# Patient Record
Sex: FEMALE | Race: WHITE | NOT HISPANIC OR LATINO | Employment: FULL TIME | URBAN - METROPOLITAN AREA
[De-identification: names, ages, dates, MRNs, and addresses within clinical notes are randomized per-mention and may not be internally consistent; named-entity substitution may affect disease eponyms.]

---

## 2017-01-23 ENCOUNTER — LAB (OUTPATIENT)
Dept: ENDOCRINOLOGY | Age: 32
End: 2017-01-23

## 2017-01-23 DIAGNOSIS — E10.65 UNCONTROLLED TYPE 1 DIABETES MELLITUS WITH HYPERGLYCEMIA (HCC): ICD-10-CM

## 2017-01-23 DIAGNOSIS — E55.9 VITAMIN D DEFICIENCY DISEASE: Primary | ICD-10-CM

## 2017-01-23 DIAGNOSIS — E55.9 VITAMIN D DEFICIENCY DISEASE: ICD-10-CM

## 2017-01-24 LAB
25(OH)D3+25(OH)D2 SERPL-MCNC: 52.3 NG/ML (ref 30–100)
ALBUMIN SERPL-MCNC: 4.4 G/DL (ref 3.5–5.2)
ALBUMIN/GLOB SERPL: 1.6 G/DL
ALP SERPL-CCNC: 53 U/L (ref 39–117)
ALT SERPL-CCNC: 27 U/L (ref 1–33)
AST SERPL-CCNC: 35 U/L (ref 1–32)
BILIRUB SERPL-MCNC: 0.5 MG/DL (ref 0.1–1.2)
BUN SERPL-MCNC: 10 MG/DL (ref 6–20)
BUN/CREAT SERPL: 11.4 (ref 7–25)
CALCIUM SERPL-MCNC: 9.9 MG/DL (ref 8.6–10.5)
CHLORIDE SERPL-SCNC: 100 MMOL/L (ref 98–107)
CHOLEST SERPL-MCNC: 185 MG/DL (ref 0–200)
CO2 SERPL-SCNC: 25.3 MMOL/L (ref 22–29)
CREAT SERPL-MCNC: 0.88 MG/DL (ref 0.57–1)
GLOBULIN SER CALC-MCNC: 2.7 GM/DL
GLUCOSE SERPL-MCNC: 170 MG/DL (ref 65–99)
HBA1C MFR BLD: 7.74 % (ref 4.8–5.6)
HDLC SERPL-MCNC: 77 MG/DL (ref 40–60)
LDLC SERPL CALC-MCNC: 93 MG/DL (ref 0–100)
MICROALBUMIN UR-MCNC: 13.7 UG/ML
POTASSIUM SERPL-SCNC: 4.2 MMOL/L (ref 3.5–5.2)
PROT SERPL-MCNC: 7.1 G/DL (ref 6–8.5)
SODIUM SERPL-SCNC: 140 MMOL/L (ref 136–145)
T3FREE SERPL-MCNC: 3.3 PG/ML (ref 2–4.4)
T4 FREE SERPL-MCNC: 1.35 NG/DL (ref 0.93–1.7)
T4 SERPL-MCNC: 7 MCG/DL (ref 4.5–11.7)
TRIGL SERPL-MCNC: 74 MG/DL (ref 0–150)
TSH SERPL DL<=0.005 MIU/L-ACNC: 2.99 MIU/ML (ref 0.27–4.2)
VLDLC SERPL CALC-MCNC: 14.8 MG/DL (ref 5–40)

## 2017-02-06 ENCOUNTER — OFFICE VISIT (OUTPATIENT)
Dept: ENDOCRINOLOGY | Age: 32
End: 2017-02-06

## 2017-02-06 VITALS
RESPIRATION RATE: 16 BRPM | SYSTOLIC BLOOD PRESSURE: 118 MMHG | BODY MASS INDEX: 26.43 KG/M2 | HEIGHT: 67 IN | DIASTOLIC BLOOD PRESSURE: 70 MMHG | WEIGHT: 168.4 LBS

## 2017-02-06 DIAGNOSIS — E10.65 UNCONTROLLED TYPE 1 DIABETES MELLITUS WITH HYPERGLYCEMIA (HCC): Primary | ICD-10-CM

## 2017-02-06 DIAGNOSIS — E04.2 NONTOXIC MULTINODULAR GOITER: ICD-10-CM

## 2017-02-06 DIAGNOSIS — E55.9 VITAMIN D DEFICIENCY DISEASE: ICD-10-CM

## 2017-02-06 PROCEDURE — 99214 OFFICE O/P EST MOD 30 MIN: CPT | Performed by: INTERNAL MEDICINE

## 2017-02-06 NOTE — PROGRESS NOTES
Subjective   Nandini Morrison is a 31 y.o. female seen for follow up for DM1, lab review. Patient is checking BG 6-8 times a day. She is needing a refill on her pen needles. She denies any problems or concerns.   History of Present Illness this is a 31-year-old female known patient with type I diabetes and vitamin D deficiency.  Over the course of last 6 months she has had no significant health problems for which to go to emergency room or hospital.  Of point of interest is her mother who is suffering from hypothyroidism.    The following portions of the patient's history were reviewed and updated as appropriate: allergies, current medications, past family history, past medical history, past social history, past surgical history and problem list.    Review of Systems   Constitutional: Negative.    HENT: Negative.    Eyes: Negative.    Respiratory: Negative.    Cardiovascular: Negative.    Gastrointestinal: Negative.    Endocrine: Negative.    Genitourinary: Negative.    Musculoskeletal: Negative.    Skin: Negative.    Allergic/Immunologic: Negative.    Neurological: Negative.    Hematological: Negative.    Psychiatric/Behavioral: Negative.        Objective   Physical Exam   Constitutional: She is oriented to person, place, and time. She appears well-developed and well-nourished. No distress.   HENT:   Head: Normocephalic and atraumatic.   Right Ear: External ear normal.   Left Ear: External ear normal.   Nose: Nose normal.   Mouth/Throat: Oropharynx is clear and moist. No oropharyngeal exudate.   Eyes: Conjunctivae and EOM are normal. Pupils are equal, round, and reactive to light. Right eye exhibits no discharge. Left eye exhibits no discharge. No scleral icterus.   Neck: Trachea normal, normal range of motion and full passive range of motion without pain. Neck supple. No JVD present. No tracheal tenderness present. Carotid bruit is not present. No tracheal deviation, no edema and no erythema present. Thyromegaly  present. No thyroid mass present.   Slight bilateral enlargement of the thyroid.   Cardiovascular: Normal rate, regular rhythm, normal heart sounds and intact distal pulses.  Exam reveals no gallop and no friction rub.    No murmur heard.  Pulmonary/Chest: Effort normal and breath sounds normal. No stridor. No respiratory distress. She has no wheezes. She has no rales.   Abdominal: Soft. Bowel sounds are normal. She exhibits no distension and no mass. There is no tenderness. There is no rebound and no guarding. No hernia.   Musculoskeletal: Normal range of motion. She exhibits no edema or deformity.   Lymphadenopathy:     She has no cervical adenopathy.   Neurological: She is alert and oriented to person, place, and time. She has normal reflexes. She displays normal reflexes. No cranial nerve deficit. She exhibits normal muscle tone. Coordination normal.   Skin: Skin is warm and dry. No rash noted. She is not diaphoretic. No erythema. No pallor.   Psychiatric: She has a normal mood and affect. Her behavior is normal. Judgment and thought content normal.   Nursing note and vitals reviewed.        Assessment/Plan   Diagnoses and all orders for this visit:    Uncontrolled type 1 diabetes mellitus with hyperglycemia  -     T4 & TSH ([facility]); Future  -     Uric Acid; Future  -     Vitamin D 25 Hydroxy; Future  -     Comprehensive Metabolic Panel; Future  -     Lipid Panel; Future  -     MicroAlbumin, Urine, Random; Future  -     Cancel: T3, Free; Future  -     Cancel: T4, Free; Future  -     Cancel: Thyroglobulin With Anti-TG; Future  -     T3, Free; Future  -     T4, Free; Future  -     Thyroglobulin With Anti-TG; Future    Vitamin D deficiency disease  -     T4 & TSH ([facility]); Future  -     Uric Acid; Future  -     Vitamin D 25 Hydroxy; Future  -     Comprehensive Metabolic Panel; Future  -     Lipid Panel; Future  -     MicroAlbumin, Urine, Random; Future  -     Cancel: T3, Free; Future  -     Cancel: T4, Free;  "Future  -     Cancel: Thyroglobulin With Anti-TG; Future  -     T3, Free; Future  -     T4, Free; Future  -     Thyroglobulin With Anti-TG; Future    Nontoxic multinodular goiter  -     Cancel: T3, Free; Future  -     Cancel: T4, Free; Future  -     Cancel: Thyroglobulin With Anti-TG; Future  -     T3, Free; Future  -     T4, Free; Future  -     Thyroglobulin With Anti-TG; Future    Other orders  -     Needle, Disp, (BD DISP NEEDLES) 25G X 5/8\" misc; 4 times a day for insulin injection.              in summary I saw and examined this 31-year-old female for above-mentioned problems.  I reviewed her laboratory evaluation of 01/23/2017 and provided her with a hard copy of it.  With the exception of a slightly elevated of hemoglobin A1c of 7.73 she is otherwise clinically and metabolically stable.  I also discussed with her new [employer] insulin pumps which calmed with a sensor and makes the glycemic control easier to manage.  She expressed interest in getting a brochure large literature about this device and then will let us know.  She will see Ms. Elo Patrick in 4 months and myself in 8 months or sooner if needed with laboratory evaluation prior to office visit.    "

## 2017-02-14 DIAGNOSIS — E10.65 UNCONTROLLED TYPE 1 DIABETES MELLITUS WITH HYPERGLYCEMIA (HCC): ICD-10-CM

## 2017-02-14 RX ORDER — INSULIN GLARGINE 100 [IU]/ML
INJECTION, SOLUTION SUBCUTANEOUS
Qty: 30 ML | Refills: 3 | Status: SHIPPED | OUTPATIENT
Start: 2017-02-14 | End: 2017-02-16 | Stop reason: CLARIF

## 2017-02-14 RX ORDER — ERGOCALCIFEROL 1.25 MG/1
CAPSULE ORAL
Qty: 12 CAPSULE | Refills: 3 | Status: SHIPPED | OUTPATIENT
Start: 2017-02-14 | End: 2018-02-28 | Stop reason: SDUPTHER

## 2017-02-16 RX ORDER — INSULIN DEGLUDEC 200 U/ML
25 INJECTION, SOLUTION SUBCUTANEOUS DAILY
Qty: 6 PEN | Refills: 3 | Status: SHIPPED | OUTPATIENT
Start: 2017-02-16 | End: 2017-06-20 | Stop reason: SDUPTHER

## 2017-05-24 ENCOUNTER — RESULTS ENCOUNTER (OUTPATIENT)
Dept: ENDOCRINOLOGY | Age: 32
End: 2017-05-24

## 2017-05-24 DIAGNOSIS — E10.65 UNCONTROLLED TYPE 1 DIABETES MELLITUS WITH HYPERGLYCEMIA (HCC): ICD-10-CM

## 2017-05-24 DIAGNOSIS — E04.2 NONTOXIC MULTINODULAR GOITER: ICD-10-CM

## 2017-05-24 DIAGNOSIS — E55.9 VITAMIN D DEFICIENCY DISEASE: ICD-10-CM

## 2017-06-19 ENCOUNTER — OFFICE VISIT (OUTPATIENT)
Dept: ENDOCRINOLOGY | Age: 32
End: 2017-06-19

## 2017-06-19 VITALS
BODY MASS INDEX: 26.87 KG/M2 | WEIGHT: 171.2 LBS | HEIGHT: 67 IN | DIASTOLIC BLOOD PRESSURE: 66 MMHG | SYSTOLIC BLOOD PRESSURE: 124 MMHG

## 2017-06-19 DIAGNOSIS — E55.9 VITAMIN D DEFICIENCY DISEASE: ICD-10-CM

## 2017-06-19 DIAGNOSIS — IMO0002 UNCONTROLLED TYPE 1 DIABETES MELLITUS WITH COMPLICATION: Primary | ICD-10-CM

## 2017-06-19 DIAGNOSIS — E04.2 NONTOXIC MULTINODULAR GOITER: ICD-10-CM

## 2017-06-19 PROCEDURE — 99214 OFFICE O/P EST MOD 30 MIN: CPT | Performed by: NURSE PRACTITIONER

## 2017-06-20 LAB
25(OH)D3+25(OH)D2 SERPL-MCNC: 40.2 NG/ML (ref 30–100)
ALBUMIN SERPL-MCNC: 4.6 G/DL (ref 3.5–5.2)
ALBUMIN/GLOB SERPL: 1.6 G/DL
ALP SERPL-CCNC: 47 U/L (ref 39–117)
ALT SERPL-CCNC: 27 U/L (ref 1–33)
AST SERPL-CCNC: 26 U/L (ref 1–32)
BILIRUB SERPL-MCNC: 0.3 MG/DL (ref 0.1–1.2)
BUN SERPL-MCNC: 15 MG/DL (ref 6–20)
BUN/CREAT SERPL: 16.7 (ref 7–25)
CALCIUM SERPL-MCNC: 9.8 MG/DL (ref 8.6–10.5)
CHLORIDE SERPL-SCNC: 98 MMOL/L (ref 98–107)
CHOLEST SERPL-MCNC: 188 MG/DL (ref 0–200)
CO2 SERPL-SCNC: 26.5 MMOL/L (ref 22–29)
CREAT SERPL-MCNC: 0.9 MG/DL (ref 0.57–1)
FT4I SERPL CALC-MCNC: 1.8 (ref 1.2–4.9)
GLOBULIN SER CALC-MCNC: 2.8 GM/DL
GLUCOSE SERPL-MCNC: 80 MG/DL (ref 65–99)
HBA1C MFR BLD: 7.76 % (ref 4.8–5.6)
HDLC SERPL-MCNC: 81 MG/DL (ref 40–60)
LDLC SERPL CALC-MCNC: 99 MG/DL (ref 0–100)
MICROALBUMIN UR-MCNC: <3 UG/ML
POTASSIUM SERPL-SCNC: 5.3 MMOL/L (ref 3.5–5.2)
PROT SERPL-MCNC: 7.4 G/DL (ref 6–8.5)
SODIUM SERPL-SCNC: 137 MMOL/L (ref 136–145)
T3FREE SERPL-MCNC: 3.3 PG/ML (ref 2–4.4)
T3RU NFR SERPL: 28 % (ref 24–39)
T4 FREE SERPL-MCNC: 1.37 NG/DL (ref 0.93–1.7)
T4 SERPL-MCNC: 6.5 UG/DL (ref 4.5–12)
TRIGL SERPL-MCNC: 41 MG/DL (ref 0–150)
TSH SERPL DL<=0.005 MIU/L-ACNC: 4.11 UIU/ML (ref 0.45–4.5)
VLDLC SERPL CALC-MCNC: 8.2 MG/DL (ref 5–40)

## 2017-06-20 RX ORDER — INSULIN DEGLUDEC 200 U/ML
30 INJECTION, SOLUTION SUBCUTANEOUS DAILY
Qty: 6 PEN | Refills: 3 | Status: SHIPPED | OUTPATIENT
Start: 2017-06-20 | End: 2018-10-08 | Stop reason: SDUPTHER

## 2017-08-21 DIAGNOSIS — IMO0002 UNCONTROLLED TYPE 1 DIABETES MELLITUS WITH COMPLICATION: ICD-10-CM

## 2017-08-21 RX ORDER — BLOOD SUGAR DIAGNOSTIC
1 STRIP MISCELLANEOUS 4 TIMES DAILY
Qty: 400 EACH | Refills: 1 | Status: SHIPPED | OUTPATIENT
Start: 2017-08-21 | End: 2017-12-04 | Stop reason: SDUPTHER

## 2017-10-26 ENCOUNTER — LAB (OUTPATIENT)
Dept: ENDOCRINOLOGY | Age: 32
End: 2017-10-26

## 2017-10-26 DIAGNOSIS — E55.9 VITAMIN D DEFICIENCY DISEASE: ICD-10-CM

## 2017-10-26 DIAGNOSIS — E10.65 UNCONTROLLED TYPE 1 DIABETES MELLITUS WITH HYPERGLYCEMIA (HCC): ICD-10-CM

## 2017-10-27 LAB
25(OH)D3+25(OH)D2 SERPL-MCNC: 56 NG/ML (ref 30–100)
ALBUMIN SERPL-MCNC: 4.6 G/DL (ref 3.5–5.2)
ALBUMIN/GLOB SERPL: 1.6 G/DL
ALP SERPL-CCNC: 49 U/L (ref 39–117)
ALT SERPL-CCNC: 25 U/L (ref 1–33)
AST SERPL-CCNC: 27 U/L (ref 1–32)
BILIRUB SERPL-MCNC: 0.6 MG/DL (ref 0.1–1.2)
BUN SERPL-MCNC: 9 MG/DL (ref 6–20)
BUN/CREAT SERPL: 12.3 (ref 7–25)
CALCIUM SERPL-MCNC: 9.8 MG/DL (ref 8.6–10.5)
CHLORIDE SERPL-SCNC: 97 MMOL/L (ref 98–107)
CHOLEST SERPL-MCNC: 190 MG/DL (ref 0–200)
CO2 SERPL-SCNC: 27.6 MMOL/L (ref 22–29)
CREAT SERPL-MCNC: 0.73 MG/DL (ref 0.57–1)
GFR SERPLBLD CREATININE-BSD FMLA CKD-EPI: 113 ML/MIN/1.73
GFR SERPLBLD CREATININE-BSD FMLA CKD-EPI: 93 ML/MIN/1.73
GLOBULIN SER CALC-MCNC: 2.8 GM/DL
GLUCOSE SERPL-MCNC: 238 MG/DL (ref 65–99)
HDLC SERPL-MCNC: 80 MG/DL (ref 40–60)
LDLC SERPL CALC-MCNC: 101 MG/DL (ref 0–100)
MICROALBUMIN UR-MCNC: 3.3 UG/ML
POTASSIUM SERPL-SCNC: 4.5 MMOL/L (ref 3.5–5.2)
PROT SERPL-MCNC: 7.4 G/DL (ref 6–8.5)
SODIUM SERPL-SCNC: 139 MMOL/L (ref 136–145)
T3FREE SERPL-MCNC: 3.3 PG/ML (ref 2–4.4)
T4 FREE SERPL-MCNC: 1.37 NG/DL (ref 0.93–1.7)
T4 SERPL-MCNC: 7.1 MCG/DL (ref 4.5–11.7)
THYROGLOB AB SERPL-ACNC: <1 IU/ML (ref 0–0.9)
THYROGLOB SERPL-MCNC: 3.3 NG/ML (ref 1.5–38.5)
TRIGL SERPL-MCNC: 43 MG/DL (ref 0–150)
TSH SERPL DL<=0.005 MIU/L-ACNC: 3.05 MIU/ML (ref 0.27–4.2)
URATE SERPL-MCNC: 3.8 MG/DL (ref 2.4–5.7)
VLDLC SERPL CALC-MCNC: 8.6 MG/DL (ref 5–40)

## 2017-11-09 ENCOUNTER — OFFICE VISIT (OUTPATIENT)
Dept: ENDOCRINOLOGY | Age: 32
End: 2017-11-09

## 2017-11-09 VITALS
BODY MASS INDEX: 27 KG/M2 | WEIGHT: 172 LBS | RESPIRATION RATE: 16 BRPM | HEIGHT: 67 IN | DIASTOLIC BLOOD PRESSURE: 70 MMHG | SYSTOLIC BLOOD PRESSURE: 116 MMHG

## 2017-11-09 DIAGNOSIS — E55.9 VITAMIN D DEFICIENCY DISEASE: ICD-10-CM

## 2017-11-09 DIAGNOSIS — E10.9 TYPE 1 DIABETES MELLITUS WITHOUT COMPLICATION (HCC): Primary | ICD-10-CM

## 2017-11-09 DIAGNOSIS — E04.2 NONTOXIC MULTINODULAR GOITER: ICD-10-CM

## 2017-11-09 PROCEDURE — 99214 OFFICE O/P EST MOD 30 MIN: CPT | Performed by: INTERNAL MEDICINE

## 2017-11-09 NOTE — PROGRESS NOTES
"Subjective   Nandini Morrisno is a 31 y.o. female seen for follow up for DM1, lab review. Patient is checking BG 4-6 times a day. She denies any problems or concerns.     History of Present Illness this is a 31-year-old female known patient with type I diabetes, thyroid goiter and vitamin D deficiency.  Over the course of last 4 months she has had no significant health problems for which to go to emergency room or hospital.    /70  Resp 16  Ht 67\" (170.2 cm)  Wt 172 lb (78 kg)  BMI 26.94 kg/m2     Allergies   Allergen Reactions   • Amoxicillin        Current Outpatient Prescriptions:   •  ACCU-CHEK KALYN PLUS test strip, 1 each by Other route 4 (Four) Times a Day. Use as instructed, Disp: 400 each, Rfl: 1  •  ACCU-CHEK FASTCLIX LANCETS misc, 4 (four) times a day., Disp: , Rfl:   •  ergocalciferol (DRISDOL) 95727 UNITS capsule, Take 1 po q week, Disp: 12 capsule, Rfl: 3  •  glucagon (GLUCAGON EMERGENCY) 1 MG injection, Inject 1 mg under the skin 1 (one) time as needed for low blood sugar for up to 1 dose., Disp: 1 kit, Rfl: 5  •  Multiple Vitamins-Minerals (MULTIVITAMIN PO), Take 1 tablet by mouth daily., Disp: , Rfl:   •  Needle, Disp, (BD DISP NEEDLES) 25G X 5/8\" misc, 4 times a day for insulin injection., Disp: 400 each, Rfl: 3  •  NOVOLOG FLEXPEN 100 UNIT/ML solution pen-injector sc pen, Up to 50 units daily for multiple injections, Disp: 45 mL, Rfl: 2  •  TRESIBA FLEXTOUCH 200 UNIT/ML solution pen-injector, Inject 30 Units under the skin Daily., Disp: 6 pen, Rfl: 3      The following portions of the patient's history were reviewed and updated as appropriate: allergies, current medications, past family history, past medical history, past social history, past surgical history and problem list.    Review of Systems   Constitutional: Negative.    HENT: Negative.    Eyes: Negative.    Respiratory: Negative.    Cardiovascular: Negative.    Gastrointestinal: Negative.    Endocrine: Negative.    Genitourinary: " Negative.    Musculoskeletal: Negative.    Skin: Negative.    Allergic/Immunologic: Negative.    Neurological: Negative.    Hematological: Negative.    Psychiatric/Behavioral: Negative.        Objective   Physical Exam   Constitutional: She is oriented to person, place, and time. She appears well-developed and well-nourished. No distress.   HENT:   Head: Normocephalic and atraumatic.   Right Ear: External ear normal.   Left Ear: External ear normal.   Nose: Nose normal.   Mouth/Throat: Oropharynx is clear and moist. No oropharyngeal exudate.   Eyes: Conjunctivae and EOM are normal. Pupils are equal, round, and reactive to light. Right eye exhibits no discharge. Left eye exhibits no discharge. No scleral icterus.   Neck: Trachea normal, normal range of motion and full passive range of motion without pain. Neck supple. No JVD present. No tracheal tenderness present. Carotid bruit is not present. No tracheal deviation, no edema and no erythema present. Thyromegaly present. No thyroid mass present.   Slight bilateral enlargement of the thyroid.   Cardiovascular: Normal rate, regular rhythm, normal heart sounds and intact distal pulses.  Exam reveals no gallop and no friction rub.    No murmur heard.  Pulmonary/Chest: Effort normal and breath sounds normal. No stridor. No respiratory distress. She has no wheezes. She has no rales.   Abdominal: Soft. Bowel sounds are normal. She exhibits no distension and no mass. There is no tenderness. There is no rebound and no guarding. No hernia.   Musculoskeletal: Normal range of motion. She exhibits no edema or deformity.   Lymphadenopathy:     She has no cervical adenopathy.   Neurological: She is alert and oriented to person, place, and time. She has normal reflexes. She displays normal reflexes. No cranial nerve deficit. She exhibits normal muscle tone. Coordination normal.   Skin: Skin is warm and dry. No rash noted. She is not diaphoretic. No erythema. No pallor.    Psychiatric: She has a normal mood and affect. Her behavior is normal. Judgment and thought content normal.   Nursing note and vitals reviewed.    Results for orders placed or performed in visit on 06/19/17   Comprehensive Metabolic Panel   Result Value Ref Range    Glucose 80 65 - 99 mg/dL    BUN 15 6 - 20 mg/dL    Creatinine 0.90 0.57 - 1.00 mg/dL    eGFR Non African Am 73 >60 mL/min/1.73    eGFR African Am 89 >60 mL/min/1.73    BUN/Creatinine Ratio 16.7 7.0 - 25.0    Sodium 137 136 - 145 mmol/L    Potassium 5.3 (H) 3.5 - 5.2 mmol/L    Chloride 98 98 - 107 mmol/L    Total CO2 26.5 22.0 - 29.0 mmol/L    Calcium 9.8 8.6 - 10.5 mg/dL    Total Protein 7.4 6.0 - 8.5 g/dL    Albumin 4.60 3.50 - 5.20 g/dL    Globulin 2.8 gm/dL    A/G Ratio 1.6 g/dL    Total Bilirubin 0.3 0.1 - 1.2 mg/dL    Alkaline Phosphatase 47 39 - 117 U/L    AST (SGOT) 26 1 - 32 U/L    ALT (SGPT) 27 1 - 33 U/L   T3, Free   Result Value Ref Range    T3, Free 3.3 2.0 - 4.4 pg/mL   T4, Free   Result Value Ref Range    Free T4 1.37 0.93 - 1.70 ng/dL   Thyroid Panel With TSH   Result Value Ref Range    TSH 4.110 0.450 - 4.500 uIU/mL    T4, Total 6.5 4.5 - 12.0 ug/dL    T3 Uptake 28 24 - 39 %    Free Thyroxine Index 1.8 1.2 - 4.9   Hemoglobin A1c   Result Value Ref Range    Hemoglobin A1C 7.76 (H) 4.80 - 5.60 %   Lipid Panel   Result Value Ref Range    Total Cholesterol 188 0 - 200 mg/dL    Triglycerides 41 0 - 150 mg/dL    HDL Cholesterol 81 (H) 40 - 60 mg/dL    VLDL Cholesterol 8.2 5 - 40 mg/dL    LDL Cholesterol  99 0 - 100 mg/dL   MicroAlbumin, Urine, Random   Result Value Ref Range    Microalbumin, Urine <3.0 Not Estab. ug/mL   Vitamin D 25 Hydroxy   Result Value Ref Range    25 Hydroxy, Vitamin D 40.2 30.0 - 100.0 ng/mL         Assessment/Plan   Diagnoses and all orders for this visit:    Type 1 diabetes mellitus without complication  -     T3, Free; Future  -     T4 & TSH ([facility]); Future  -     T4, Free; Future  -     Comprehensive  Thyroglobulin; Future  -     Comprehensive Metabolic Panel; Future  -     Uric Acid; Future  -     Vitamin D 25 Hydroxy; Future  -     Lipid Panel; Future  -     MicroAlbumin, Urine, Random - Urine, Clean Catch; Future  -     Hemoglobin A1c; Future    Nontoxic multinodular goiter  -     T3, Free; Future  -     T4 & TSH ([facility]); Future  -     T4, Free; Future  -     Comprehensive Thyroglobulin; Future  -     Comprehensive Metabolic Panel; Future  -     Uric Acid; Future  -     Vitamin D 25 Hydroxy; Future  -     Lipid Panel; Future  -     MicroAlbumin, Urine, Random - Urine, Clean Catch; Future  -     Hemoglobin A1c; Future    Vitamin D deficiency disease  -     T3, Free; Future  -     T4 & TSH ([facility]); Future  -     T4, Free; Future  -     Comprehensive Thyroglobulin; Future  -     Comprehensive Metabolic Panel; Future  -     Uric Acid; Future  -     Vitamin D 25 Hydroxy; Future  -     Lipid Panel; Future  -     MicroAlbumin, Urine, Random - Urine, Clean Catch; Future  -     Hemoglobin A1c; Future               In summary I saw and examined this the 31-year-old female for above-mentioned problems.  I reviewed her laboratory evaluation of 10/26/2017 and provided her with a hard copy of it.  Overall she is clinically and metabolically stable and therefore we will continue all her current prescriptions.  She will see Ms. Elo Patrick in 4 months or sooner if needed with laboratory evaluation prior to each office visit.

## 2017-12-04 RX ORDER — LANCETS 33 GAUGE
EACH MISCELLANEOUS
Qty: 400 EACH | Refills: 1 | Status: SHIPPED | OUTPATIENT
Start: 2017-12-04 | End: 2019-04-23

## 2017-12-04 RX ORDER — BLOOD-GLUCOSE METER
1 EACH MISCELLANEOUS 4 TIMES DAILY
Qty: 1 KIT | Refills: 1 | Status: SHIPPED | OUTPATIENT
Start: 2017-12-04 | End: 2019-04-23

## 2018-02-28 ENCOUNTER — RESULTS ENCOUNTER (OUTPATIENT)
Dept: ENDOCRINOLOGY | Age: 33
End: 2018-02-28

## 2018-02-28 DIAGNOSIS — E55.9 VITAMIN D DEFICIENCY DISEASE: ICD-10-CM

## 2018-02-28 DIAGNOSIS — E04.2 NONTOXIC MULTINODULAR GOITER: ICD-10-CM

## 2018-02-28 DIAGNOSIS — E10.9 TYPE 1 DIABETES MELLITUS WITHOUT COMPLICATION (HCC): ICD-10-CM

## 2018-02-28 RX ORDER — ERGOCALCIFEROL 1.25 MG/1
CAPSULE ORAL
Qty: 12 CAPSULE | Refills: 3 | Status: SHIPPED | OUTPATIENT
Start: 2018-02-28 | End: 2018-12-07 | Stop reason: SDUPTHER

## 2018-04-12 DIAGNOSIS — IMO0002 UNCONTROLLED TYPE 1 DIABETES MELLITUS WITH COMPLICATION: Primary | ICD-10-CM

## 2018-04-12 DIAGNOSIS — E55.9 VITAMIN D DEFICIENCY DISEASE: ICD-10-CM

## 2018-04-13 ENCOUNTER — LAB (OUTPATIENT)
Dept: ENDOCRINOLOGY | Age: 33
End: 2018-04-13

## 2018-04-13 DIAGNOSIS — E55.9 VITAMIN D DEFICIENCY DISEASE: ICD-10-CM

## 2018-04-13 DIAGNOSIS — E10.9 TYPE 1 DIABETES MELLITUS WITHOUT COMPLICATION (HCC): ICD-10-CM

## 2018-04-13 DIAGNOSIS — E04.2 NONTOXIC MULTINODULAR GOITER: ICD-10-CM

## 2018-04-13 DIAGNOSIS — IMO0002 UNCONTROLLED TYPE 1 DIABETES MELLITUS WITH COMPLICATION: ICD-10-CM

## 2018-04-14 LAB
25(OH)D3+25(OH)D2 SERPL-MCNC: 66.3 NG/ML (ref 30–100)
ALBUMIN SERPL-MCNC: 4.4 G/DL (ref 3.5–5.2)
ALBUMIN/GLOB SERPL: 1.7 G/DL
ALP SERPL-CCNC: 51 U/L (ref 39–117)
ALT SERPL-CCNC: 24 U/L (ref 1–33)
AST SERPL-CCNC: 19 U/L (ref 1–32)
BILIRUB SERPL-MCNC: 0.4 MG/DL (ref 0.1–1.2)
BUN SERPL-MCNC: 15 MG/DL (ref 6–20)
BUN/CREAT SERPL: 18.5 (ref 7–25)
C PEPTIDE SERPL-MCNC: <0.1 NG/ML (ref 1.1–4.4)
CALCIUM SERPL-MCNC: 9.8 MG/DL (ref 8.6–10.5)
CHLORIDE SERPL-SCNC: 98 MMOL/L (ref 98–107)
CHOLEST SERPL-MCNC: 160 MG/DL (ref 0–200)
CO2 SERPL-SCNC: 27.6 MMOL/L (ref 22–29)
CREAT SERPL-MCNC: 0.81 MG/DL (ref 0.57–1)
GFR SERPLBLD CREATININE-BSD FMLA CKD-EPI: 82 ML/MIN/1.73
GFR SERPLBLD CREATININE-BSD FMLA CKD-EPI: 99 ML/MIN/1.73
GLOBULIN SER CALC-MCNC: 2.6 GM/DL
GLUCOSE SERPL-MCNC: 167 MG/DL (ref 65–99)
HBA1C MFR BLD: 7.4 % (ref 4.8–5.6)
HDLC SERPL-MCNC: 76 MG/DL (ref 40–60)
INTERPRETATION: NORMAL
LDLC SERPL CALC-MCNC: 73 MG/DL (ref 0–100)
Lab: NORMAL
MICROALBUMIN UR-MCNC: <3 UG/ML
POTASSIUM SERPL-SCNC: 4.1 MMOL/L (ref 3.5–5.2)
PROT SERPL-MCNC: 7 G/DL (ref 6–8.5)
SODIUM SERPL-SCNC: 139 MMOL/L (ref 136–145)
TRIGL SERPL-MCNC: 54 MG/DL (ref 0–150)
VLDLC SERPL CALC-MCNC: 10.8 MG/DL (ref 5–40)

## 2018-04-27 ENCOUNTER — OFFICE VISIT (OUTPATIENT)
Dept: ENDOCRINOLOGY | Age: 33
End: 2018-04-27

## 2018-04-27 VITALS
WEIGHT: 155 LBS | BODY MASS INDEX: 24.33 KG/M2 | SYSTOLIC BLOOD PRESSURE: 120 MMHG | DIASTOLIC BLOOD PRESSURE: 84 MMHG | HEIGHT: 67 IN

## 2018-04-27 DIAGNOSIS — E10.9 TYPE 1 DIABETES MELLITUS WITHOUT COMPLICATION (HCC): Primary | ICD-10-CM

## 2018-04-27 DIAGNOSIS — E55.9 VITAMIN D DEFICIENCY DISEASE: ICD-10-CM

## 2018-04-27 PROBLEM — E04.2 NONTOXIC MULTINODULAR GOITER: Status: RESOLVED | Noted: 2017-02-06 | Resolved: 2018-04-27

## 2018-04-27 PROCEDURE — 99214 OFFICE O/P EST MOD 30 MIN: CPT | Performed by: NURSE PRACTITIONER

## 2018-04-27 NOTE — PROGRESS NOTES
"Subjective   Nandini Morrison is a 32 y.o. female is here today for follow-up.  Chief Complaint   Patient presents with   • Diabetes     recent labs, pt tests B G4x daily, pt did not bring meter   • Goiter   • Vitamin D Deficiency     /84   Ht 170.2 cm (67\")   Wt 70.3 kg (155 lb)   BMI 24.28 kg/m²   Current Outpatient Prescriptions on File Prior to Visit   Medication Sig   • Blood Glucose Monitoring Suppl (ONETOUCH VERIO FLEX SYSTEM) w/Device kit 1 kit 4 (Four) Times a Day.   • glucagon (GLUCAGON EMERGENCY) 1 MG injection Inject 1 mg under the skin 1 (one) time as needed for low blood sugar for up to 1 dose.   • glucose blood (ONETOUCH VERIO) test strip Use to test blood sugar 4 times a day   • Multiple Vitamins-Minerals (MULTIVITAMIN PO) Take 1 tablet by mouth daily.   • Needle, Disp, ([employer] DISP NEEDLES) 25G X 5/8\" misc 4 times a day for insulin injection.   • NOVOLOG FLEXPEN 100 UNIT/ML solution pen-injector sc pen Up to 50 units daily for multiple injections   • ONETOUCH DELICA LANCETS 33G misc Use to test blood sugar 4 times a day   • TRESIBA FLEXTOUCH 200 UNIT/ML solution pen-injector Inject 30 Units under the skin Daily. (Patient taking differently: Inject 20 Units under the skin Daily.)   • vitamin D (ERGOCALCIFEROL) 39810 units capsule capsule TAKE 1 CAPSULE EVERY WEEK     No current facility-administered medications on file prior to visit.      Family History   Problem Relation Age of Onset   • Diabetes Maternal Aunt    • Hypertension Maternal Aunt      Social History   Substance Use Topics   • Smoking status: Never Smoker   • Smokeless tobacco: Not on file   • Alcohol use Yes      Comment: socially      Allergies   Allergen Reactions   • Amoxicillin          History of Present Illness   Encounter Diagnoses   Name Primary?   • Type 1 diabetes mellitus without complication Yes   • Vitamin D deficiency disease      This is a 32-year-old female patient here today for a routine follow-up visit.  She is " being seen for type 1 diabetes and vitamin D deficiency.  She does have occasional low blood sugars but she states they are explained.  She has not been exercising for 6 weeks because she has been taking care of her cat has been very ill.  She normally runs for exercise however she did have a stress fracture in her third metatarsal which has since improved.  She is up-to-date on her eye exam.  She denies any neuropathy.  He is taking her medications as prescribed.  She had recent labs prior to today's visit which were reviewed and she was provided a copy      The following portions of the patient's history were reviewed and updated as appropriate: allergies, current medications, past family history, past medical history, past social history, past surgical history and problem list.    Review of Systems   Constitutional: Negative for fatigue.   HENT: Negative for trouble swallowing.    Eyes: Negative for visual disturbance.   Respiratory: Negative for shortness of breath.    Cardiovascular: Negative for leg swelling.   Endocrine: Negative for polyuria.   Skin: Negative for wound.   Neurological: Negative for numbness.       Objective   Physical Exam   Constitutional: She is oriented to person, place, and time. She appears well-developed and well-nourished. No distress.   HENT:   Head: Normocephalic and atraumatic.   Right Ear: External ear normal.   Left Ear: External ear normal.   Nose: Nose normal.   Mouth/Throat: Oropharynx is clear and moist. No oropharyngeal exudate.   Eyes: EOM are normal. Pupils are equal, round, and reactive to light. Right eye exhibits no discharge. Left eye exhibits no discharge.   Neck: Trachea normal, normal range of motion and full passive range of motion without pain. Neck supple. No tracheal tenderness present. Carotid bruit is not present. No tracheal deviation, no edema and no erythema present. No thyroid mass and no thyromegaly present.   Cardiovascular: Normal rate, regular rhythm,  normal heart sounds and intact distal pulses.  Exam reveals no gallop and no friction rub.    No murmur heard.  Pulmonary/Chest: Effort normal and breath sounds normal. No stridor. No respiratory distress. She has no wheezes. She has no rales.   Abdominal: Soft. Bowel sounds are normal. She exhibits no distension.   Musculoskeletal: Normal range of motion. She exhibits no edema or deformity.   Lymphadenopathy:     She has no cervical adenopathy.   Neurological: She is alert and oriented to person, place, and time.   Skin: Skin is warm and dry. No rash noted. She is not diaphoretic. No erythema. No pallor.   Psychiatric: She has a normal mood and affect. Her behavior is normal. Judgment and thought content normal.   Nursing note and vitals reviewed.    Results for orders placed or performed in visit on 04/13/18   Comprehensive Metabolic Panel   Result Value Ref Range    Glucose 167 (H) 65 - 99 mg/dL    BUN 15 6 - 20 mg/dL    Creatinine 0.81 0.57 - 1.00 mg/dL    eGFR Non African Am 82 >60 mL/min/1.73    eGFR African Am 99 >60 mL/min/1.73    BUN/Creatinine Ratio 18.5 7.0 - 25.0    Sodium 139 136 - 145 mmol/L    Potassium 4.1 3.5 - 5.2 mmol/L    Chloride 98 98 - 107 mmol/L    Total CO2 27.6 22.0 - 29.0 mmol/L    Calcium 9.8 8.6 - 10.5 mg/dL    Total Protein 7.0 6.0 - 8.5 g/dL    Albumin 4.40 3.50 - 5.20 g/dL    Globulin 2.6 gm/dL    A/G Ratio 1.7 g/dL    Total Bilirubin 0.4 0.1 - 1.2 mg/dL    Alkaline Phosphatase 51 39 - 117 U/L    AST (SGOT) 19 1 - 32 U/L    ALT (SGPT) 24 1 - 33 U/L   C-Peptide   Result Value Ref Range    C-Peptide <0.1 (L) 1.1 - 4.4 ng/mL   Hemoglobin A1c   Result Value Ref Range    Hemoglobin A1C 7.40 (H) 4.80 - 5.60 %   Lipid Panel   Result Value Ref Range    Total Cholesterol 160 0 - 200 mg/dL    Triglycerides 54 0 - 150 mg/dL    HDL Cholesterol 76 (H) 40 - 60 mg/dL    VLDL Cholesterol 10.8 5 - 40 mg/dL    LDL Cholesterol  73 0 - 100 mg/dL   Vitamin D 25 Hydroxy   Result Value Ref Range    25  Hydroxy, Vitamin D 66.3 30.0 - 100.0 ng/mL   Cardiovascular Risk Assessment   Result Value Ref Range    Interpretation Note    Diabetes Patient Education   Result Value Ref Range    PDF Image Not applicable    MicroAlbumin, Urine, Random   Result Value Ref Range    Microalbumin, Urine <3.0 Not Estab. ug/mL         Assessment/Plan   Problems Addressed this Visit        Digestive    Vitamin D deficiency disease       Endocrine    Type 1 diabetes mellitus - Primary      Other Visit Diagnoses    None.       In summary, patient was seen and examined.  Her medication list was reviewed.  Metabolically she is stable hemoglobin A1c has improved.  She still slightly above goal of 7.  She did not bring her blood glucose meter in to today's visit.  She does have occasional hypoglycemic events but states they are explained.  She has few back to exercise now that her stress fracture has healed and her cat has improved from being normal.  Metabolically she is stable.  She is up-to-date on exam.  She will follow-up with Dr. Medina or myself in 3-4 months.  I've encouraged her to contact the office should she have any questions or concerns.

## 2018-04-30 DIAGNOSIS — E10.65 UNCONTROLLED TYPE 1 DIABETES MELLITUS WITH HYPERGLYCEMIA (HCC): ICD-10-CM

## 2018-04-30 RX ORDER — INSULIN ASPART 100 [IU]/ML
INJECTION, SOLUTION INTRAVENOUS; SUBCUTANEOUS
Qty: 45 ML | Refills: 1 | Status: SHIPPED | OUTPATIENT
Start: 2018-04-30 | End: 2018-12-07 | Stop reason: SDUPTHER

## 2018-09-11 DIAGNOSIS — E55.9 VITAMIN D DEFICIENCY DISEASE: Primary | ICD-10-CM

## 2018-09-11 DIAGNOSIS — IMO0002 UNCONTROLLED TYPE 1 DIABETES MELLITUS WITH COMPLICATION: ICD-10-CM

## 2018-09-11 DIAGNOSIS — R53.83 OTHER FATIGUE: ICD-10-CM

## 2018-09-20 ENCOUNTER — LAB (OUTPATIENT)
Dept: ENDOCRINOLOGY | Age: 33
End: 2018-09-20

## 2018-09-20 DIAGNOSIS — IMO0002 UNCONTROLLED TYPE 1 DIABETES MELLITUS WITH COMPLICATION: ICD-10-CM

## 2018-09-20 DIAGNOSIS — R53.83 OTHER FATIGUE: ICD-10-CM

## 2018-09-20 DIAGNOSIS — E55.9 VITAMIN D DEFICIENCY DISEASE: ICD-10-CM

## 2018-09-21 LAB
25(OH)D3+25(OH)D2 SERPL-MCNC: 57.6 NG/ML (ref 30–100)
ALBUMIN SERPL-MCNC: 4.5 G/DL (ref 3.5–5.2)
ALBUMIN/GLOB SERPL: 1.7 G/DL
ALP SERPL-CCNC: 53 U/L (ref 39–117)
ALT SERPL-CCNC: 36 U/L (ref 1–33)
AST SERPL-CCNC: 34 U/L (ref 1–32)
BILIRUB SERPL-MCNC: 0.6 MG/DL (ref 0.1–1.2)
BUN SERPL-MCNC: 14 MG/DL (ref 6–20)
BUN/CREAT SERPL: 18.4 (ref 7–25)
C PEPTIDE SERPL-MCNC: <0.1 NG/ML (ref 1.1–4.4)
CALCIUM SERPL-MCNC: 10.1 MG/DL (ref 8.6–10.5)
CHLORIDE SERPL-SCNC: 102 MMOL/L (ref 98–107)
CHOLEST SERPL-MCNC: 175 MG/DL (ref 0–200)
CO2 SERPL-SCNC: 25.1 MMOL/L (ref 22–29)
CREAT SERPL-MCNC: 0.76 MG/DL (ref 0.57–1)
FT4I SERPL CALC-MCNC: 1.9 (ref 1.2–4.9)
GLOBULIN SER CALC-MCNC: 2.7 GM/DL
GLUCOSE SERPL-MCNC: 97 MG/DL (ref 65–99)
HBA1C MFR BLD: 7.67 % (ref 4.8–5.6)
HDLC SERPL-MCNC: 80 MG/DL (ref 40–60)
INTERPRETATION: NORMAL
LDLC SERPL CALC-MCNC: 86 MG/DL (ref 0–100)
Lab: NORMAL
MICROALBUMIN UR-MCNC: 8.4 UG/ML
POTASSIUM SERPL-SCNC: 4.9 MMOL/L (ref 3.5–5.2)
PROT SERPL-MCNC: 7.2 G/DL (ref 6–8.5)
SODIUM SERPL-SCNC: 141 MMOL/L (ref 136–145)
T3FREE SERPL-MCNC: 2.9 PG/ML (ref 2–4.4)
T3RU NFR SERPL: 29 % (ref 24–39)
T4 FREE SERPL-MCNC: 1.46 NG/DL (ref 0.93–1.7)
T4 SERPL-MCNC: 6.6 UG/DL (ref 4.5–12)
TRIGL SERPL-MCNC: 43 MG/DL (ref 0–150)
TSH SERPL DL<=0.005 MIU/L-ACNC: 2.1 UIU/ML (ref 0.45–4.5)
VLDLC SERPL CALC-MCNC: 8.6 MG/DL (ref 5–40)

## 2018-10-08 RX ORDER — INSULIN DEGLUDEC 200 U/ML
30 INJECTION, SOLUTION SUBCUTANEOUS DAILY
Qty: 18 ML | Refills: 3 | Status: CANCELLED | OUTPATIENT
Start: 2018-10-08

## 2018-10-08 RX ORDER — INSULIN DEGLUDEC 200 U/ML
20 INJECTION, SOLUTION SUBCUTANEOUS DAILY
Qty: 18 ML | Refills: 0 | Status: SHIPPED | OUTPATIENT
Start: 2018-10-08 | End: 2018-12-07 | Stop reason: SDUPTHER

## 2018-10-09 RX ORDER — BLOOD SUGAR DIAGNOSTIC
STRIP MISCELLANEOUS
Qty: 400 EACH | Refills: 0 | Status: SHIPPED | OUTPATIENT
Start: 2018-10-09 | End: 2019-01-02 | Stop reason: SDUPTHER

## 2018-12-07 ENCOUNTER — OFFICE VISIT (OUTPATIENT)
Dept: ENDOCRINOLOGY | Age: 33
End: 2018-12-07

## 2018-12-07 VITALS
BODY MASS INDEX: 26.06 KG/M2 | WEIGHT: 166 LBS | HEIGHT: 67 IN | SYSTOLIC BLOOD PRESSURE: 122 MMHG | DIASTOLIC BLOOD PRESSURE: 68 MMHG

## 2018-12-07 DIAGNOSIS — E55.9 VITAMIN D DEFICIENCY DISEASE: ICD-10-CM

## 2018-12-07 DIAGNOSIS — E10.65 UNCONTROLLED TYPE 1 DIABETES MELLITUS WITH HYPERGLYCEMIA (HCC): ICD-10-CM

## 2018-12-07 DIAGNOSIS — E10.65 TYPE 1 DIABETES MELLITUS WITH HYPERGLYCEMIA (HCC): Primary | ICD-10-CM

## 2018-12-07 PROCEDURE — 99214 OFFICE O/P EST MOD 30 MIN: CPT | Performed by: NURSE PRACTITIONER

## 2018-12-07 RX ORDER — FLASH GLUCOSE SENSOR
1 KIT MISCELLANEOUS
Qty: 2 EACH | Refills: 5 | Status: SHIPPED | OUTPATIENT
Start: 2018-12-07 | End: 2019-04-23

## 2018-12-07 RX ORDER — FLASH GLUCOSE SCANNING READER
1 EACH MISCELLANEOUS DAILY
Qty: 1 DEVICE | Refills: 0 | Status: SHIPPED | OUTPATIENT
Start: 2018-12-07 | End: 2019-04-23

## 2018-12-07 RX ORDER — INSULIN DEGLUDEC 200 U/ML
20 INJECTION, SOLUTION SUBCUTANEOUS DAILY
Qty: 18 ML | Refills: 1 | Status: SHIPPED | OUTPATIENT
Start: 2018-12-07 | End: 2019-10-24 | Stop reason: SDUPTHER

## 2018-12-07 RX ORDER — ERGOCALCIFEROL 1.25 MG/1
50000 CAPSULE ORAL
Qty: 12 CAPSULE | Refills: 1 | Status: SHIPPED | OUTPATIENT
Start: 2018-12-07 | End: 2019-03-19 | Stop reason: SDUPTHER

## 2018-12-07 RX ORDER — INSULIN ASPART 100 [IU]/ML
INJECTION, SOLUTION INTRAVENOUS; SUBCUTANEOUS
Qty: 45 ML | Refills: 1 | Status: SHIPPED | OUTPATIENT
Start: 2018-12-07 | End: 2020-04-27 | Stop reason: CLARIF

## 2018-12-07 NOTE — PROGRESS NOTES
"Subjective   Nandini Morrison is a 32 y.o. female is here today for follow-up.  Chief Complaint   Patient presents with   • Diabetes     Recent labs, pt tests BG 4-6x daily, pt did not bring meter   • Vitamin D Deficiency     /68   Ht 170.2 cm (67\")   Wt 75.3 kg (166 lb)   BMI 26.00 kg/m²   Current Outpatient Medications on File Prior to Visit   Medication Sig   • Blood Glucose Monitoring Suppl (ONETOUCH VERIO FLEX SYSTEM) w/Device kit 1 kit 4 (Four) Times a Day.   • glucagon (GLUCAGON EMERGENCY) 1 MG injection Inject 1 mg under the skin 1 (one) time as needed for low blood sugar for up to 1 dose.   • Multiple Vitamins-Minerals (MULTIVITAMIN PO) Take 1 tablet by mouth daily.   • Needle, Disp, (Descomplica DISP NEEDLES) 25G X 5/8\" misc 4 times a day for insulin injection.   • NOVOLOG FLEXPEN 100 UNIT/ML solution pen-injector sc pen Up to 50 units daily for multiple injections   • ONETOUCH DELICA LANCETS 33G misc Use to test blood sugar 4 times a day   • ONETOUCH VERIO test strip Use to test blood sugar 4 times a day   • TRESIBA FLEXTOUCH 200 UNIT/ML solution pen-injector Inject 20 Units under the skin into the appropriate area as directed Daily.   • vitamin D (ERGOCALCIFEROL) 67703 units capsule capsule TAKE 1 CAPSULE EVERY WEEK     No current facility-administered medications on file prior to visit.      Family History   Problem Relation Age of Onset   • Diabetes Maternal Aunt    • Hypertension Maternal Aunt      Social History     Tobacco Use   • Smoking status: Never Smoker   Substance Use Topics   • Alcohol use: Yes     Comment: socially    • Drug use: Not on file     Allergies   Allergen Reactions   • Amoxicillin          History of Present Illness  Encounter Diagnoses   Name Primary?   • Vitamin D deficiency disease    • Type 1 diabetes mellitus with hyperglycemia (CMS/[facility]) Yes   32-year-old male patient here today for routine follow-up visit.  She is being seen for the above-mentioned problems.  She did not " bring her blood glucose meter to today's visit.  She states she checks her blood sugars 4-6 times daily.  She works 12 hour shifts 3 days weekly.  She states she doesn't always check her blood sugars as often during work due to being too busy.  He denies any significant hypoglycemic events however she states when she does have a low blood sugar she usually can determine why it happened.  She is active as far as physical exercise with running.  We discussed the benefit of continuous glucose monitoring to alert her to the low blood sugars and also to keep better track of her glycemic control.  She had recent labs which were reviewed and she was provided a copy    The following portions of the patient's history were reviewed and updated as appropriate: allergies, current medications, past family history, past medical history, past social history, past surgical history and problem list.    Review of Systems   Constitutional: Negative for fatigue.   HENT: Negative for trouble swallowing.    Eyes: Negative for visual disturbance.   Respiratory: Negative for shortness of breath.    Cardiovascular: Negative for leg swelling.   Endocrine: Negative for polyuria.   Skin: Negative for wound.   Neurological: Negative for numbness.       Objective   Physical Exam   Constitutional: She is oriented to person, place, and time. She appears well-developed and well-nourished. No distress.   HENT:   Head: Normocephalic and atraumatic.   Right Ear: External ear normal.   Left Ear: External ear normal.   Nose: Nose normal.   Mouth/Throat: Oropharynx is clear and moist. No oropharyngeal exudate.   Eyes: EOM are normal. Pupils are equal, round, and reactive to light. Right eye exhibits no discharge. Left eye exhibits no discharge.   Neck: Trachea normal, normal range of motion and full passive range of motion without pain. Neck supple. No tracheal tenderness present. Carotid bruit is not present. No tracheal deviation, no edema and no  erythema present. No thyroid mass and no thyromegaly present.   Cardiovascular: Normal rate, regular rhythm, normal heart sounds and intact distal pulses. Exam reveals no gallop and no friction rub.   No murmur heard.  Pulmonary/Chest: Effort normal and breath sounds normal. No stridor. No respiratory distress. She has no wheezes. She has no rales.   Abdominal: Soft. Bowel sounds are normal. She exhibits no distension.   Musculoskeletal: Normal range of motion. She exhibits no edema or deformity.   Lymphadenopathy:     She has no cervical adenopathy.   Neurological: She is alert and oriented to person, place, and time.   Skin: Skin is warm and dry. No rash noted. She is not diaphoretic. No erythema. No pallor.   Psychiatric: She has a normal mood and affect. Her behavior is normal. Judgment and thought content normal.   Nursing note and vitals reviewed.    Results for orders placed or performed in visit on 09/20/18   Comprehensive Metabolic Panel   Result Value Ref Range    Glucose 97 65 - 99 mg/dL    BUN 14 6 - 20 mg/dL    Creatinine 0.76 0.57 - 1.00 mg/dL    eGFR Non African Am 88 >60 mL/min/1.73    eGFR African Am 107 >60 mL/min/1.73    BUN/Creatinine Ratio 18.4 7.0 - 25.0    Sodium 141 136 - 145 mmol/L    Potassium 4.9 3.5 - 5.2 mmol/L    Chloride 102 98 - 107 mmol/L    Total CO2 25.1 22.0 - 29.0 mmol/L    Calcium 10.1 8.6 - 10.5 mg/dL    Total Protein 7.2 6.0 - 8.5 g/dL    Albumin 4.50 3.50 - 5.20 g/dL    Globulin 2.7 gm/dL    A/G Ratio 1.7 g/dL    Total Bilirubin 0.6 0.1 - 1.2 mg/dL    Alkaline Phosphatase 53 39 - 117 U/L    AST (SGOT) 34 (H) 1 - 32 U/L    ALT (SGPT) 36 (H) 1 - 33 U/L   C-Peptide   Result Value Ref Range    C-Peptide <0.1 (L) 1.1 - 4.4 ng/mL   Hemoglobin A1c   Result Value Ref Range    Hemoglobin A1C 7.67 (H) 4.80 - 5.60 %   Lipid Panel   Result Value Ref Range    Total Cholesterol 175 0 - 200 mg/dL    Triglycerides 43 0 - 150 mg/dL    HDL Cholesterol 80 (H) 40 - 60 mg/dL    VLDL Cholesterol  8.6 5 - 40 mg/dL    LDL Cholesterol  86 0 - 100 mg/dL   MicroAlbumin, Urine, Random - Urine, Clean Catch   Result Value Ref Range    Microalbumin, Urine 8.4 Not Estab. ug/mL   T3, Free   Result Value Ref Range    T3, Free 2.9 2.0 - 4.4 pg/mL   T4, Free   Result Value Ref Range    Free T4 1.46 0.93 - 1.70 ng/dL   Thyroid Panel With TSH   Result Value Ref Range    TSH 2.100 0.450 - 4.500 uIU/mL    T4, Total 6.6 4.5 - 12.0 ug/dL    T3 Uptake 29 24 - 39 %    Free Thyroxine Index 1.9 1.2 - 4.9   Vitamin D 25 Hydroxy   Result Value Ref Range    25 Hydroxy, Vitamin D 57.6 30.0 - 100.0 ng/ml   Cardiovascular Risk Assessment   Result Value Ref Range    Interpretation Note    Diabetes Patient Education   Result Value Ref Range    PDF Image Not applicable          Assessment/Plan   Problems Addressed this Visit        Digestive    Vitamin D deficiency disease       Endocrine    Type 1 diabetes mellitus (CMS/[facility]) - Primary        Summary, patient was seen and examined.  Metabolically she is stable.  Her hemoglobin A1c is slightly above goal at 7.67.  Patient would benefit from continuous glucose monitoring and she was provided options at today's visit.  She will review and decide which option is best for her.  She was given information on the dexcom and Freestyle Sue.  She is to follow-up in 4 months with Dr. Medina or myself.  Her prescription refills have been sent to her pharmacy per her request.  No medications were added or changed at today's visit.  Her vitamin D is in satisfactory range.

## 2019-01-02 RX ORDER — BLOOD SUGAR DIAGNOSTIC
STRIP MISCELLANEOUS
Qty: 400 EACH | Refills: 0 | Status: SHIPPED | OUTPATIENT
Start: 2019-01-02 | End: 2019-04-23

## 2019-03-19 RX ORDER — ERGOCALCIFEROL 1.25 MG/1
CAPSULE ORAL
Qty: 12 CAPSULE | Refills: 3 | OUTPATIENT
Start: 2019-03-19

## 2019-03-19 RX ORDER — ERGOCALCIFEROL 1.25 MG/1
50000 CAPSULE ORAL
Qty: 12 CAPSULE | Refills: 0 | Status: SHIPPED | OUTPATIENT
Start: 2019-03-19 | End: 2019-03-19 | Stop reason: SDUPTHER

## 2019-03-19 RX ORDER — ERGOCALCIFEROL 1.25 MG/1
50000 CAPSULE ORAL
Qty: 12 CAPSULE | Refills: 0 | Status: SHIPPED | OUTPATIENT
Start: 2019-03-19 | End: 2020-04-27 | Stop reason: SDDI

## 2019-03-29 DIAGNOSIS — E55.9 VITAMIN D DEFICIENCY DISEASE: ICD-10-CM

## 2019-03-29 DIAGNOSIS — E10.65 UNCONTROLLED TYPE 1 DIABETES MELLITUS WITH HYPERGLYCEMIA (HCC): Primary | ICD-10-CM

## 2019-04-17 ENCOUNTER — LAB (OUTPATIENT)
Dept: ENDOCRINOLOGY | Age: 34
End: 2019-04-17

## 2019-04-17 DIAGNOSIS — E55.9 VITAMIN D DEFICIENCY DISEASE: ICD-10-CM

## 2019-04-17 DIAGNOSIS — E10.65 UNCONTROLLED TYPE 1 DIABETES MELLITUS WITH HYPERGLYCEMIA (HCC): ICD-10-CM

## 2019-04-19 LAB
25(OH)D3+25(OH)D2 SERPL-MCNC: 45.8 NG/ML (ref 30–100)
ALBUMIN SERPL-MCNC: 4.4 G/DL (ref 3.5–5.2)
ALBUMIN/GLOB SERPL: 1.6 G/DL
ALP SERPL-CCNC: 52 U/L (ref 39–117)
ALT SERPL-CCNC: 30 U/L (ref 1–33)
AST SERPL-CCNC: 27 U/L (ref 1–32)
BILIRUB SERPL-MCNC: 0.5 MG/DL (ref 0.2–1.2)
BUN SERPL-MCNC: 16 MG/DL (ref 6–20)
BUN/CREAT SERPL: 18.8 (ref 7–25)
C PEPTIDE SERPL-MCNC: <0.1 NG/ML (ref 1.1–4.4)
CALCIUM SERPL-MCNC: 10 MG/DL (ref 8.6–10.5)
CHLORIDE SERPL-SCNC: 102 MMOL/L (ref 98–107)
CHOLEST SERPL-MCNC: 180 MG/DL (ref 0–200)
CO2 SERPL-SCNC: 28.9 MMOL/L (ref 22–29)
CREAT SERPL-MCNC: 0.85 MG/DL (ref 0.57–1)
FT4I SERPL CALC-MCNC: 1.8 (ref 1.2–4.9)
GLOBULIN SER CALC-MCNC: 2.7 GM/DL
GLUCOSE SERPL-MCNC: 106 MG/DL (ref 65–99)
HBA1C MFR BLD: 7 % (ref 4.8–5.6)
HDLC SERPL-MCNC: 85 MG/DL (ref 40–60)
INTERPRETATION: NORMAL
LDLC SERPL CALC-MCNC: 86 MG/DL (ref 0–100)
Lab: NORMAL
POTASSIUM SERPL-SCNC: 4.6 MMOL/L (ref 3.5–5.2)
PROT SERPL-MCNC: 7.1 G/DL (ref 6–8.5)
SODIUM SERPL-SCNC: 140 MMOL/L (ref 136–145)
T3FREE SERPL-MCNC: 3.1 PG/ML (ref 2–4.4)
T3RU NFR SERPL: 28 % (ref 24–39)
T4 FREE SERPL-MCNC: 1.32 NG/DL (ref 0.93–1.7)
T4 SERPL-MCNC: 6.6 UG/DL (ref 4.5–12)
THYROGLOB AB SERPL-ACNC: <1 IU/ML
THYROGLOB SERPL-MCNC: 1.8 NG/ML
THYROGLOB SERPL-MCNC: NORMAL NG/ML
TRIGL SERPL-MCNC: 45 MG/DL (ref 0–150)
TSH SERPL DL<=0.005 MIU/L-ACNC: 3.03 UIU/ML (ref 0.45–4.5)
VLDLC SERPL CALC-MCNC: 9 MG/DL

## 2019-04-23 ENCOUNTER — OFFICE VISIT (OUTPATIENT)
Dept: ENDOCRINOLOGY | Age: 34
End: 2019-04-23

## 2019-04-23 VITALS
WEIGHT: 160 LBS | SYSTOLIC BLOOD PRESSURE: 122 MMHG | DIASTOLIC BLOOD PRESSURE: 72 MMHG | BODY MASS INDEX: 25.11 KG/M2 | HEIGHT: 67 IN

## 2019-04-23 DIAGNOSIS — E10.8 TYPE 1 DIABETES MELLITUS WITH COMPLICATION (HCC): Primary | ICD-10-CM

## 2019-04-23 DIAGNOSIS — IMO0002 DIABETES TYPE 1, UNCONTROLLED: ICD-10-CM

## 2019-04-23 DIAGNOSIS — E55.9 VITAMIN D DEFICIENCY DISEASE: ICD-10-CM

## 2019-04-23 PROCEDURE — 99214 OFFICE O/P EST MOD 30 MIN: CPT | Performed by: NURSE PRACTITIONER

## 2019-04-23 PROCEDURE — 95251 CONT GLUC MNTR ANALYSIS I&R: CPT | Performed by: NURSE PRACTITIONER

## 2019-04-23 NOTE — PROGRESS NOTES
"Subjective   Nandini Morrison is a 33 y.o. female is here today for follow-up.  Chief Complaint   Patient presents with   • Diabetes     recent labs, dexcom download   • Vitamin D Deficiency     /72   Ht 170.2 cm (67\")   Wt 72.6 kg (160 lb)   BMI 25.06 kg/m²   Current Outpatient Medications on File Prior to Visit   Medication Sig   • glucagon (GLUCAGON EMERGENCY) 1 MG injection Inject 1 mg under the skin 1 (one) time as needed for low blood sugar for up to 1 dose.   • Multiple Vitamins-Minerals (MULTIVITAMIN PO) Take 1 tablet by mouth daily.   • NOVOLOG FLEXPEN 100 UNIT/ML solution pen-injector sc pen Up to 50 units daily for multiple injections   • TRESIBA FLEXTOUCH 200 UNIT/ML solution pen-injector Inject 20 Units under the skin into the appropriate area as directed Daily.   • vitamin D (ERGOCALCIFEROL) 30031 units capsule capsule Take 1 capsule by mouth Every 7 (Seven) Days.   • Needle, Disp, (BD DISP NEEDLES) 25G X 5/8\" misc 4 times a day for insulin injection.   • [DISCONTINUED] Blood Glucose Monitoring Suppl (ONETOUCH VERIO FLEX SYSTEM) w/Device kit 1 kit 4 (Four) Times a Day.   • [DISCONTINUED] Continuous Blood Gluc  (FREESTYLE LULU 14 DAY READER) device 1 Device Daily.   • [DISCONTINUED] Continuous Blood Gluc Sensor (FREESTYLE LULU 14 DAY SENSOR) misc 1 application Every 14 (Fourteen) Days.   • [DISCONTINUED] ONETOUCH DELICA LANCETS 33G misc Use to test blood sugar 4 times a day   • [DISCONTINUED] ONETOUCH VERIO test strip Use to test blood sugar 4 times a day     No current facility-administered medications on file prior to visit.      Family History   Problem Relation Age of Onset   • Diabetes Maternal Aunt    • Hypertension Maternal Aunt      Social History     Tobacco Use   • Smoking status: Never Smoker   Substance Use Topics   • Alcohol use: Yes     Comment: socially    • Drug use: Not on file     Allergies   Allergen Reactions   • Amoxicillin          History of Present " Illness  Encounter Diagnoses   Name Primary?   • Vitamin D deficiency disease    • Type 1 diabetes mellitus with complication (CMS/[facility]) Yes     33-year-old female patient here today for routine follow-up visit.  She has been seen for the above-mentioned problems.  She is requesting a refill on her glucagon kit.  She is training for a full marathon to be ran this weekend.  She is been able to complete 21 miles so far.  She has been proactive and monitoring her blood sugars.  She is wearing a continuous glucose monitoring sensor which will alert her she has low blood sugars.  Her sensor download was reviewed.  According to her 2-week average she is 46% of the time in target range and 49% of the time above target.  She shows patterns typically during nights when she works of having hypoglycemia associated with meals.  We discussed being more proactive and adjusting insulin based on food and blood sugars with meals.  The following portions of the patient's history were reviewed and updated as appropriate: allergies, current medications, past family history, past medical history, past social history, past surgical history and problem list.    Review of Systems   Constitutional: Negative for fatigue.   HENT: Negative for trouble swallowing.    Eyes: Negative for visual disturbance.   Cardiovascular: Negative for leg swelling.   Endocrine: Negative for polyuria.   Skin: Negative for wound.   Neurological: Negative for numbness.       Objective   Physical Exam   Constitutional: She is oriented to person, place, and time. She appears well-developed and well-nourished. No distress.   HENT:   Head: Normocephalic and atraumatic.   Right Ear: External ear normal.   Left Ear: External ear normal.   Nose: Nose normal.   Mouth/Throat: Oropharynx is clear and moist. No oropharyngeal exudate.   Eyes: EOM are normal. Pupils are equal, round, and reactive to light. Right eye exhibits no discharge. Left eye exhibits no discharge.    Neck: Trachea normal, normal range of motion and full passive range of motion without pain. Neck supple. No tracheal tenderness present. Carotid bruit is not present. No tracheal deviation, no edema and no erythema present. No thyroid mass and no thyromegaly present.   Cardiovascular: Normal rate, regular rhythm, normal heart sounds and intact distal pulses. Exam reveals no gallop and no friction rub.   No murmur heard.  Pulmonary/Chest: Effort normal and breath sounds normal. No stridor. No respiratory distress. She has no wheezes. She has no rales.   Abdominal: Soft. Bowel sounds are normal. She exhibits no distension.   Musculoskeletal: Normal range of motion. She exhibits no edema or deformity.   Lymphadenopathy:     She has no cervical adenopathy.   Neurological: She is alert and oriented to person, place, and time.   Skin: Skin is warm and dry. No rash noted. She is not diaphoretic. No erythema. No pallor.   Psychiatric: She has a normal mood and affect. Her behavior is normal. Judgment and thought content normal.   Nursing note and vitals reviewed.    Results for orders placed or performed in visit on 04/17/19   Comprehensive Thyroglobulin   Result Value Ref Range    Thyroglobulin Ab <1.0 IU/mL    Thyroglobulin 1.8 ng/mL    Thyroglobulin (TG-BENJIE) Comment ng/mL   Thyroid Panel With TSH   Result Value Ref Range    TSH 3.030 0.450 - 4.500 uIU/mL    T4, Total 6.6 4.5 - 12.0 ug/dL    T3 Uptake 28 24 - 39 %    Free Thyroxine Index 1.8 1.2 - 4.9   T4, Free   Result Value Ref Range    Free T4 1.32 0.93 - 1.70 ng/dL   T3, Free   Result Value Ref Range    T3, Free 3.1 2.0 - 4.4 pg/mL   C-Peptide   Result Value Ref Range    C-Peptide <0.1 (L) 1.1 - 4.4 ng/mL   Hemoglobin A1c   Result Value Ref Range    Hemoglobin A1C 7.00 (H) 4.80 - 5.60 %   Vitamin D 25 Hydroxy   Result Value Ref Range    25 Hydroxy, Vitamin D 45.8 30.0 - 100.0 ng/ml   Lipid Panel   Result Value Ref Range    Total Cholesterol 180 0 - 200 mg/dL     Triglycerides 45 0 - 150 mg/dL    HDL Cholesterol 85 (H) 40 - 60 mg/dL    VLDL Cholesterol 9 mg/dL    LDL Cholesterol  86 0 - 100 mg/dL   Comprehensive Metabolic Panel   Result Value Ref Range    Glucose 106 (H) 65 - 99 mg/dL    BUN 16 6 - 20 mg/dL    Creatinine 0.85 0.57 - 1.00 mg/dL    eGFR Non African Am 77 >60 mL/min/1.73    eGFR African Am 93 >60 mL/min/1.73    BUN/Creatinine Ratio 18.8 7.0 - 25.0    Sodium 140 136 - 145 mmol/L    Potassium 4.6 3.5 - 5.2 mmol/L    Chloride 102 98 - 107 mmol/L    Total CO2 28.9 22.0 - 29.0 mmol/L    Calcium 10.0 8.6 - 10.5 mg/dL    Total Protein 7.1 6.0 - 8.5 g/dL    Albumin 4.40 3.50 - 5.20 g/dL    Globulin 2.7 gm/dL    A/G Ratio 1.6 g/dL    Total Bilirubin 0.5 0.2 - 1.2 mg/dL    Alkaline Phosphatase 52 39 - 117 U/L    AST (SGOT) 27 1 - 32 U/L    ALT (SGPT) 30 1 - 33 U/L   Cardiovascular Risk Assessment   Result Value Ref Range    Interpretation Note    Diabetes Patient Education   Result Value Ref Range    PDF Image Not applicable            Assessment/Plan   Problems Addressed this Visit        Digestive    Vitamin D deficiency disease       Endocrine    Type 1 diabetes mellitus (CMS/[facility]CC) - Primary          In summary, patient was seen and examined.  Her continuous glucose monitoring sensor was downloaded and reviewed.  She is staying above target 49% of the time and was within target 46% of the time.  She has had occasional hypoglycemic events 2% of the time.  We discussed her patterns of her nighttime highs between 2:15 AM and 6:40 AM.  She is going to watch her blood sugars more closely while she is working.  Her hemoglobin A1c has improved according to her recent labs.  Her most recent A1c is 7%.  Her thyroid hormones are in satisfactory range.  She has good HDL cholesterol.  She is athletic and training for marathon.  Her blood pressure is in satisfactory range.  She will follow-up in 6 months with labs.

## 2019-08-30 ENCOUNTER — TELEPHONE (OUTPATIENT)
Dept: ENDOCRINOLOGY | Age: 34
End: 2019-08-30

## 2019-08-30 NOTE — TELEPHONE ENCOUNTER
Office note has been faxed    ----- Message from Gwendolyn Peña sent at 8/29/2019  2:06 PM EDT -----  Contact: [place] [phone]  FOLLOW UP ON CHART NOTE REQUEST   REQUESTED ON 26TH OF AUG    FAX -[phone]

## 2019-09-25 DIAGNOSIS — E10.8 TYPE 1 DIABETES MELLITUS WITH COMPLICATION (HCC): ICD-10-CM

## 2019-09-25 DIAGNOSIS — E55.9 VITAMIN D DEFICIENCY DISEASE: Primary | ICD-10-CM

## 2019-10-16 ENCOUNTER — LAB (OUTPATIENT)
Dept: ENDOCRINOLOGY | Age: 34
End: 2019-10-16

## 2019-10-16 DIAGNOSIS — E10.8 TYPE 1 DIABETES MELLITUS WITH COMPLICATION (HCC): ICD-10-CM

## 2019-10-16 DIAGNOSIS — E55.9 VITAMIN D DEFICIENCY DISEASE: ICD-10-CM

## 2019-10-19 LAB
25(OH)D3+25(OH)D2 SERPL-MCNC: 42.1 NG/ML (ref 30–100)
ALBUMIN SERPL-MCNC: 4.5 G/DL (ref 3.5–5.2)
ALBUMIN/GLOB SERPL: 1.5 G/DL
ALP SERPL-CCNC: 103 U/L (ref 39–117)
ALT SERPL-CCNC: 42 U/L (ref 1–33)
AST SERPL-CCNC: 36 U/L (ref 1–32)
BILIRUB SERPL-MCNC: 0.3 MG/DL (ref 0.2–1.2)
BUN SERPL-MCNC: 16 MG/DL (ref 6–20)
BUN/CREAT SERPL: 20.8 (ref 7–25)
CALCIUM SERPL-MCNC: 9.9 MG/DL (ref 8.6–10.5)
CHLORIDE SERPL-SCNC: 98 MMOL/L (ref 98–107)
CHOLEST SERPL-MCNC: 215 MG/DL (ref 0–200)
CO2 SERPL-SCNC: 26.6 MMOL/L (ref 22–29)
CREAT SERPL-MCNC: 0.77 MG/DL (ref 0.57–1)
FT4I SERPL CALC-MCNC: 2.4 (ref 1.2–4.9)
GLOBULIN SER CALC-MCNC: 3.1 GM/DL
GLUCOSE SERPL-MCNC: 109 MG/DL (ref 65–99)
HBA1C MFR BLD: 7.5 % (ref 4.8–5.6)
HDLC SERPL-MCNC: 91 MG/DL (ref 40–60)
INTERPRETATION: NORMAL
LDLC SERPL CALC-MCNC: 114 MG/DL (ref 0–100)
Lab: NORMAL
POTASSIUM SERPL-SCNC: 4.4 MMOL/L (ref 3.5–5.2)
PROT SERPL-MCNC: 7.6 G/DL (ref 6–8.5)
SODIUM SERPL-SCNC: 137 MMOL/L (ref 136–145)
T3FREE SERPL-MCNC: 3.3 PG/ML (ref 2–4.4)
T3RU NFR SERPL: 29 % (ref 24–39)
T4 FREE SERPL-MCNC: 1.36 NG/DL (ref 0.93–1.7)
T4 SERPL-MCNC: 8.3 UG/DL (ref 4.5–12)
THYROGLOB AB SERPL-ACNC: <1 IU/ML
THYROGLOB SERPL-MCNC: 2.4 NG/ML
THYROGLOB SERPL-MCNC: NORMAL NG/ML
TRIGL SERPL-MCNC: 48 MG/DL (ref 0–150)
TSH SERPL DL<=0.005 MIU/L-ACNC: 2.57 UIU/ML (ref 0.45–4.5)
UNABLE TO VOID: NORMAL
VLDLC SERPL CALC-MCNC: 9.6 MG/DL

## 2019-10-24 ENCOUNTER — OFFICE VISIT (OUTPATIENT)
Dept: ENDOCRINOLOGY | Age: 34
End: 2019-10-24

## 2019-10-24 VITALS
SYSTOLIC BLOOD PRESSURE: 118 MMHG | DIASTOLIC BLOOD PRESSURE: 66 MMHG | HEIGHT: 67 IN | WEIGHT: 185 LBS | BODY MASS INDEX: 29.03 KG/M2

## 2019-10-24 DIAGNOSIS — E55.9 VITAMIN D DEFICIENCY DISEASE: ICD-10-CM

## 2019-10-24 DIAGNOSIS — R63.5 ABNORMAL WEIGHT GAIN: ICD-10-CM

## 2019-10-24 DIAGNOSIS — E10.65 UNCONTROLLED TYPE 1 DIABETES MELLITUS WITH HYPERGLYCEMIA (HCC): Primary | ICD-10-CM

## 2019-10-24 PROCEDURE — 99214 OFFICE O/P EST MOD 30 MIN: CPT | Performed by: NURSE PRACTITIONER

## 2019-10-24 PROCEDURE — 95251 CONT GLUC MNTR ANALYSIS I&R: CPT | Performed by: NURSE PRACTITIONER

## 2019-10-24 RX ORDER — INSULIN DEGLUDEC 200 U/ML
20 INJECTION, SOLUTION SUBCUTANEOUS DAILY
Qty: 18 ML | Refills: 1 | Status: SHIPPED | OUTPATIENT
Start: 2019-10-24 | End: 2020-02-28 | Stop reason: SDUPTHER

## 2019-10-24 NOTE — PROGRESS NOTES
"Subjective   Nandini Morrison is a 33 y.o. female is here today for follow-up.  Chief Complaint   Patient presents with   • Diabetes     recent labs, dexcom download   • Vitamin D Deficiency     /66   Ht 170.2 cm (67\")   Wt 83.9 kg (185 lb)   BMI 28.98 kg/m²   Current Outpatient Medications on File Prior to Visit   Medication Sig   • glucagon (GLUCAGON EMERGENCY) 1 MG injection Inject 1 mg under the skin into the appropriate area as directed 1 (One) Time As Needed for Low Blood Sugar for up to 1 dose.   • Multiple Vitamins-Minerals (MULTIVITAMIN PO) Take 1 tablet by mouth daily.   • Needle, Disp, (BD DISP NEEDLES) 25G X 5/8\" misc 4 times a day for insulin injection.   • NOVOLOG FLEXPEN 100 UNIT/ML solution pen-injector sc pen Up to 50 units daily for multiple injections   • vitamin D (ERGOCALCIFEROL) 91582 units capsule capsule Take 1 capsule by mouth Every 7 (Seven) Days.   • [DISCONTINUED] TRESIBA FLEXTOUCH 200 UNIT/ML solution pen-injector Inject 20 Units under the skin into the appropriate area as directed Daily.     No current facility-administered medications on file prior to visit.      Family History   Problem Relation Age of Onset   • Diabetes Maternal Aunt    • Hypertension Maternal Aunt      Social History     Tobacco Use   • Smoking status: Never Smoker   Substance Use Topics   • Alcohol use: Yes     Comment: socially    • Drug use: Not on file     Allergies   Allergen Reactions   • Amoxicillin          History of Present Illness   Encounter Diagnoses   Name Primary?   • Uncontrolled type 1 diabetes mellitus with hyperglycemia (CMS/[facility]) Yes   • Vitamin D deficiency disease    • Abnormal weight gain    33-year-old male patient here today for routine follow-up visit.  She has been seen for the above-mentioned problems.  She had recent labs which were reviewed and she was provided a copy.  She is on a continuous glucose monitoring sensor the Dexcom.  Her recent A1c is at 7.5.  According to her " download she is in target range 44% of the time.  She has had low blood sugars 4% of the time and she has been above target 52% of the time.  She states she has had bronchitis for the past several months and is been on 2 rounds of steroids and 3 antibiotics.  She is finally starting to feel better.  She is currently doing 1 unit of insulin for every 12 g of carbohydrates.  We discussed decreasing to 1 unit for every 10 g of carbohydrates.  According to her download she is staying with a pattern of daytime highs between 430 and 5:35 PM and also has between 11 PM and 4:10 AM.      The following portions of the patient's history were reviewed and updated as appropriate: allergies, current medications, past family history, past medical history, past social history, past surgical history and problem list.    Review of Systems   Constitutional: Negative.    HENT: Negative.    Eyes: Negative.    Cardiovascular: Negative.    Endocrine: Negative.    Skin: Negative.    Neurological: Negative.        Objective   Physical Exam   Constitutional: She is oriented to person, place, and time. She appears well-developed and well-nourished. No distress.   HENT:   Head: Normocephalic and atraumatic.   Right Ear: External ear normal.   Left Ear: External ear normal.   Nose: Nose normal.   Mouth/Throat: Oropharynx is clear and moist. No oropharyngeal exudate.   Eyes: EOM are normal. Pupils are equal, round, and reactive to light. Right eye exhibits no discharge. Left eye exhibits no discharge.   Neck: Trachea normal, normal range of motion and full passive range of motion without pain. Neck supple. No tracheal tenderness present. Carotid bruit is not present. No tracheal deviation, no edema and no erythema present. No thyroid mass and no thyromegaly present.   Cardiovascular: Normal rate, regular rhythm, normal heart sounds and intact distal pulses. Exam reveals no gallop and no friction rub.   No murmur heard.  Pulmonary/Chest: Effort  normal and breath sounds normal. No stridor. No respiratory distress. She has no wheezes. She has no rales.   Abdominal: Soft. Bowel sounds are normal. She exhibits no distension.   Musculoskeletal: Normal range of motion. She exhibits no edema or deformity.   Lymphadenopathy:     She has no cervical adenopathy.   Neurological: She is alert and oriented to person, place, and time.   Skin: Skin is warm and dry. No rash noted. She is not diaphoretic. No erythema. No pallor.   Psychiatric: She has a normal mood and affect. Her behavior is normal. Judgment and thought content normal.   Nursing note and vitals reviewed.    Results for orders placed or performed in visit on 10/16/19   Comprehensive Thyroglobulin   Result Value Ref Range    Thyroglobulin Ab <1.0 IU/mL    Thyroglobulin 2.4 ng/mL    Thyroglobulin (TG-BENJIE) Comment ng/mL   Thyroid Panel With TSH   Result Value Ref Range    TSH 2.570 0.450 - 4.500 uIU/mL    T4, Total 8.3 4.5 - 12.0 ug/dL    T3 Uptake 29 24 - 39 %    Free Thyroxine Index 2.4 1.2 - 4.9   T4, Free   Result Value Ref Range    Free T4 1.36 0.93 - 1.70 ng/dL   T3, Free   Result Value Ref Range    T3, Free 3.3 2.0 - 4.4 pg/mL   Vitamin D 25 Hydroxy   Result Value Ref Range    25 Hydroxy, Vitamin D 42.1 30.0 - 100.0 ng/ml   Hemoglobin A1c   Result Value Ref Range    Hemoglobin A1C 7.50 (H) 4.80 - 5.60 %   Lipid Panel   Result Value Ref Range    Total Cholesterol 215 (H) 0 - 200 mg/dL    Triglycerides 48 0 - 150 mg/dL    HDL Cholesterol 91 (H) 40 - 60 mg/dL    VLDL Cholesterol 9.6 mg/dL    LDL Cholesterol  114 (H) 0 - 100 mg/dL   Comprehensive Metabolic Panel   Result Value Ref Range    Glucose 109 (H) 65 - 99 mg/dL    BUN 16 6 - 20 mg/dL    Creatinine 0.77 0.57 - 1.00 mg/dL    eGFR Non African Am 86 >60 mL/min/1.73    eGFR African Am 105 >60 mL/min/1.73    BUN/Creatinine Ratio 20.8 7.0 - 25.0    Sodium 137 136 - 145 mmol/L    Potassium 4.4 3.5 - 5.2 mmol/L    Chloride 98 98 - 107 mmol/L    Total CO2  26.6 22.0 - 29.0 mmol/L    Calcium 9.9 8.6 - 10.5 mg/dL    Total Protein 7.6 6.0 - 8.5 g/dL    Albumin 4.50 3.50 - 5.20 g/dL    Globulin 3.1 gm/dL    A/G Ratio 1.5 g/dL    Total Bilirubin 0.3 0.2 - 1.2 mg/dL    Alkaline Phosphatase 103 39 - 117 U/L    AST (SGOT) 36 (H) 1 - 32 U/L    ALT (SGPT) 42 (H) 1 - 33 U/L   Cardiovascular Risk Assessment   Result Value Ref Range    Interpretation Note    Diabetes Patient Education   Result Value Ref Range    PDF Image Not applicable    Unable To Void   Result Value Ref Range    Unable to Void Comment          Assessment/Plan   Problems Addressed this Visit        Digestive    Vitamin D deficiency disease       Endocrine    Diabetes type 1, uncontrolled (CMS/[facility]) - Primary    Relevant Medications    TRESIBA FLEXTOUCH 200 UNIT/ML solution pen-injector pen injection       Other    Abnormal weight gain        Patient was seen and examined.  Clinically and metabolically she is stable.  Her A1c is at 7.5.  This is an increase from her previous A1c of 7.  We discussed decreasing her carb insulin ratio 1 to every 10 to reduce her postprandial blood sugars.  She is on a continuous glucose monitoring sensor which was downloaded and interpreted.  Patient was provided a copy.  She does have patterns of hyperglycemia.  She has had minimal low blood sugars.  She is above target 52% of the time.  She will follow-up in 6 months with dr heath or myself with labs prior.  Her blood pressure is in satisfactory range.  Vitamin D is stable.

## 2020-02-28 RX ORDER — INSULIN LISPRO 100 [IU]/ML
INJECTION, SOLUTION INTRAVENOUS; SUBCUTANEOUS
Qty: 45 ML | Refills: 0 | Status: SHIPPED | OUTPATIENT
Start: 2020-02-28 | End: 2020-09-08

## 2020-02-28 RX ORDER — INSULIN DEGLUDEC 200 U/ML
20 INJECTION, SOLUTION SUBCUTANEOUS DAILY
Qty: 18 ML | Refills: 1 | Status: SHIPPED | OUTPATIENT
Start: 2020-02-28 | End: 2021-02-19 | Stop reason: SDUPTHER

## 2020-04-13 ENCOUNTER — RESULTS ENCOUNTER (OUTPATIENT)
Dept: ENDOCRINOLOGY | Age: 35
End: 2020-04-13

## 2020-04-13 DIAGNOSIS — R53.83 OTHER FATIGUE: ICD-10-CM

## 2020-04-13 DIAGNOSIS — E55.9 VITAMIN D DEFICIENCY DISEASE: ICD-10-CM

## 2020-04-13 DIAGNOSIS — E10.65 UNCONTROLLED TYPE 1 DIABETES MELLITUS WITH HYPERGLYCEMIA (HCC): Primary | ICD-10-CM

## 2020-04-13 DIAGNOSIS — E10.65 UNCONTROLLED TYPE 1 DIABETES MELLITUS WITH HYPERGLYCEMIA (HCC): ICD-10-CM

## 2020-04-15 LAB
25(OH)D3+25(OH)D2 SERPL-MCNC: 30.5 NG/ML (ref 30–100)
ALBUMIN SERPL-MCNC: 4.7 G/DL (ref 3.5–5.2)
ALBUMIN/CREAT UR: 10 MG/G CREAT (ref 0–29)
ALBUMIN/GLOB SERPL: 1.9 G/DL
ALP SERPL-CCNC: 73 U/L (ref 39–117)
ALT SERPL-CCNC: 24 U/L (ref 1–33)
AST SERPL-CCNC: 26 U/L (ref 1–32)
BILIRUB SERPL-MCNC: 0.3 MG/DL (ref 0.2–1.2)
BUN SERPL-MCNC: 12 MG/DL (ref 6–20)
BUN/CREAT SERPL: 14.1 (ref 7–25)
CALCIUM SERPL-MCNC: 9.9 MG/DL (ref 8.6–10.5)
CHLORIDE SERPL-SCNC: 99 MMOL/L (ref 98–107)
CHOLEST SERPL-MCNC: 200 MG/DL (ref 0–200)
CO2 SERPL-SCNC: 23.6 MMOL/L (ref 22–29)
CREAT SERPL-MCNC: 0.85 MG/DL (ref 0.57–1)
CREAT UR-MCNC: 165.5 MG/DL
FT4I SERPL CALC-MCNC: 1.9 (ref 1.2–4.9)
GLOBULIN SER CALC-MCNC: 2.5 GM/DL
GLUCOSE SERPL-MCNC: 98 MG/DL (ref 65–99)
HBA1C MFR BLD: 7.3 % (ref 4.8–5.6)
HDLC SERPL-MCNC: 77 MG/DL (ref 40–60)
INTERPRETATION: NORMAL
LDLC SERPL CALC-MCNC: 113 MG/DL (ref 0–100)
Lab: NORMAL
MICROALBUMIN UR-MCNC: 16.8 UG/ML
POTASSIUM SERPL-SCNC: 4.3 MMOL/L (ref 3.5–5.2)
PROT SERPL-MCNC: 7.2 G/DL (ref 6–8.5)
SODIUM SERPL-SCNC: 136 MMOL/L (ref 136–145)
T3FREE SERPL-MCNC: 3.2 PG/ML (ref 2–4.4)
T3RU NFR SERPL: 26 % (ref 24–39)
T4 FREE SERPL-MCNC: 1.15 NG/DL (ref 0.93–1.7)
T4 SERPL-MCNC: 7.4 UG/DL (ref 4.5–12)
THYROGLOB AB SERPL-ACNC: <1 IU/ML
THYROGLOB SERPL-MCNC: 5.3 NG/ML
THYROGLOB SERPL-MCNC: NORMAL NG/ML
TRIGL SERPL-MCNC: 52 MG/DL (ref 0–150)
TSH SERPL DL<=0.005 MIU/L-ACNC: 3.88 UIU/ML (ref 0.45–4.5)
VLDLC SERPL CALC-MCNC: 10.4 MG/DL (ref 5–40)

## 2020-04-18 ENCOUNTER — RESULTS ENCOUNTER (OUTPATIENT)
Dept: ENDOCRINOLOGY | Age: 35
End: 2020-04-18

## 2020-04-18 DIAGNOSIS — E10.65 UNCONTROLLED TYPE 1 DIABETES MELLITUS WITH HYPERGLYCEMIA (HCC): ICD-10-CM

## 2020-04-18 DIAGNOSIS — R53.83 OTHER FATIGUE: ICD-10-CM

## 2020-04-18 DIAGNOSIS — E55.9 VITAMIN D DEFICIENCY DISEASE: ICD-10-CM

## 2020-04-27 ENCOUNTER — OFFICE VISIT (OUTPATIENT)
Dept: ENDOCRINOLOGY | Age: 35
End: 2020-04-27

## 2020-04-27 DIAGNOSIS — E78.5 HYPERLIPIDEMIA, UNSPECIFIED HYPERLIPIDEMIA TYPE: ICD-10-CM

## 2020-04-27 DIAGNOSIS — E10.9 TYPE 1 DIABETES MELLITUS WITHOUT COMPLICATION (HCC): Primary | ICD-10-CM

## 2020-04-27 DIAGNOSIS — E55.9 VITAMIN D DEFICIENCY DISEASE: ICD-10-CM

## 2020-04-27 PROCEDURE — 99442 PR PHYS/QHP TELEPHONE EVALUATION 11-20 MIN: CPT | Performed by: NURSE PRACTITIONER

## 2020-04-27 RX ORDER — ERGOCALCIFEROL 1.25 MG/1
50000 CAPSULE ORAL
Qty: 12 CAPSULE | Refills: 1 | Status: SHIPPED | OUTPATIENT
Start: 2020-04-27 | End: 2020-10-29

## 2020-04-27 NOTE — PROGRESS NOTES
"Subjective   Nandini Morrison is a 34 y.o. female   Chief Complaint   Patient presents with   • Diabetes     lab review; denies any problems or concerns; using a Dexcom CGM   • Vitamin D Deficiency     There were no vitals taken for this visit.  Current Outpatient Medications on File Prior to Visit   Medication Sig   • glucagon (GLUCAGON EMERGENCY) 1 MG injection Inject 1 mg under the skin into the appropriate area as directed 1 (One) Time As Needed for Low Blood Sugar for up to 1 dose.   • Insulin Lispro, 1 Unit Dial, (HUMALOG KWIKPEN) 100 UNIT/ML solution pen-injector Up to 50 units daily for multiple injections   • Multiple Vitamins-Minerals (MULTIVITAMIN PO) Take 1 tablet by mouth daily.   • Needle, Disp, (BD DISP NEEDLES) 25G X 5/8\" misc 4 times a day for insulin injection.   • TRESIBA FLEXTOUCH 200 UNIT/ML solution pen-injector pen injection Inject 20 Units under the skin into the appropriate area as directed Daily.   • [DISCONTINUED] vitamin D (ERGOCALCIFEROL) 89667 units capsule capsule Take 1 capsule by mouth Every 7 (Seven) Days.   • [DISCONTINUED] NOVOLOG FLEXPEN 100 UNIT/ML solution pen-injector sc pen Up to 50 units daily for multiple injections     No current facility-administered medications on file prior to visit.      Family History   Problem Relation Age of Onset   • Diabetes Maternal Aunt    • Hypertension Maternal Aunt      Social History     Tobacco Use   • Smoking status: Never Smoker   Substance Use Topics   • Alcohol use: Yes     Comment: socially    • Drug use: Not on file     Allergies   Allergen Reactions   • Amoxicillin      You have chosen to receive care through a telephone visit. Do you consent to use a telephone visit for your medical care today? Yes  Total time 15 min     History of Present Illness   Encounter Diagnoses   Name Primary?   • Vitamin D deficiency disease    • Hyperlipidemia, unspecified hyperlipidemia type    • Type 1 diabetes mellitus without complication (CMS/[facility]) Yes "     34-year-old female patient being evaluated today for the above diagnoses.  She has a history of type 1 diabetes.  She has occasional hypoglycemic events but nothing significant.  She had recent labs which were reviewed.  Her most recent A1c is 7.3 which has improved from 7.5.  She does not have a thyroid issues.  She has good HDL cholesterol and high LDL cholesterol.  Her medication list was reviewed and updated.  She has had a recent insurance change however she is good on her current medications.  She has not been taking vitamin D as prescribed.  She is vitamin D deficient according to her recent labs.  A new prescription will be sent to her pharmacy.  She denies any physical changes.  No changes to her vision and no skin changes.  She denies neuropathy    The following portions of the patient's history were reviewed and updated as appropriate: allergies, current medications, past family history, past medical history, past social history, past surgical history and problem list.    Review of Systems   Constitutional: Negative for fatigue and fever.   Respiratory: Negative for cough and shortness of breath.    Endocrine: Negative for cold intolerance and heat intolerance.   Psychiatric/Behavioral: Negative for sleep disturbance.       Objective   Physical Exam   Constitutional: She is oriented to person, place, and time. She appears well-developed and well-nourished. No distress.   HENT:   Head: Normocephalic.   Neck: Normal range of motion.   Cardiovascular: Normal rate and regular rhythm.   Pulmonary/Chest: Effort normal. No respiratory distress.   Abdominal: Soft.   Musculoskeletal: Normal range of motion. She exhibits no edema.   Neurological: She is alert and oriented to person, place, and time.   Skin: Skin is warm and dry.   Psychiatric: She has a normal mood and affect. Her behavior is normal. Judgment and thought content normal.     Results for orders placed or performed in visit on 04/13/20    Comprehensive Metabolic Panel   Result Value Ref Range    Glucose 98 65 - 99 mg/dL    BUN 12 6 - 20 mg/dL    Creatinine 0.85 0.57 - 1.00 mg/dL    eGFR Non African Am 77 >60 mL/min/1.73    eGFR African Am 93 >60 mL/min/1.73    BUN/Creatinine Ratio 14.1 7.0 - 25.0    Sodium 136 136 - 145 mmol/L    Potassium 4.3 3.5 - 5.2 mmol/L    Chloride 99 98 - 107 mmol/L    Total CO2 23.6 22.0 - 29.0 mmol/L    Calcium 9.9 8.6 - 10.5 mg/dL    Total Protein 7.2 6.0 - 8.5 g/dL    Albumin 4.70 3.50 - 5.20 g/dL    Globulin 2.5 gm/dL    A/G Ratio 1.9 g/dL    Total Bilirubin 0.3 0.2 - 1.2 mg/dL    Alkaline Phosphatase 73 39 - 117 U/L    AST (SGOT) 26 1 - 32 U/L    ALT (SGPT) 24 1 - 33 U/L   Lipid Panel   Result Value Ref Range    Total Cholesterol 200 0 - 200 mg/dL    Triglycerides 52 0 - 150 mg/dL    HDL Cholesterol 77 (H) 40 - 60 mg/dL    VLDL Cholesterol 10.4 5 - 40 mg/dL    LDL Cholesterol  113 (H) 0 - 100 mg/dL   Thyroid Panel With TSH   Result Value Ref Range    TSH 3.880 0.450 - 4.500 uIU/mL    T4, Total 7.4 4.5 - 12.0 ug/dL    T3 Uptake 26 24 - 39 %    Free Thyroxine Index 1.9 1.2 - 4.9   Comprehensive Thyroglobulin   Result Value Ref Range    Thyroglobulin Ab <1.0 IU/mL    Thyroglobulin 5.3 ng/mL    Thyroglobulin (TG-BENJIE) Comment ng/mL   Microalbumin / Creatinine Urine Ratio -   Result Value Ref Range    Creatinine, Urine 165.5 Not Estab. mg/dL    Microalbumin, Urine 16.8 Not Estab. ug/mL    Microalbumin/Creatinine Ratio 10 0 - 29 mg/g creat   Cardiovascular Risk Assessment   Result Value Ref Range    Interpretation Note    Diabetes Patient Education   Result Value Ref Range    PDF Image Not applicable    Hemoglobin A1c   Result Value Ref Range    Hemoglobin A1C 7.30 (H) 4.80 - 5.60 %   T4, Free   Result Value Ref Range    Free T4 1.15 0.93 - 1.70 ng/dL   Vitamin D 25 Hydroxy   Result Value Ref Range    25 Hydroxy, Vitamin D 30.5 30.0 - 100.0 ng/ml   T3, Free   Result Value Ref Range    T3, Free 3.2 2.0 - 4.4 pg/mL          Assessment/Plan   Problems Addressed this Visit        Cardiovascular and Mediastinum    Hyperlipidemia       Digestive    Vitamin D deficiency disease       Endocrine    Diabetes mellitus type I (CMS/HCC) - Primary      In summary patient was evaluated.  Metabolically and clinically she is stable.  Her A1c has improved to 7.3.  She will be restarted on vitamin D for vitamin D deficiency.  Her medication list was reviewed and updated.  She denies any neuropathy.  She has had no physical changes.  She will follow-up in our office in 6 months with labs prior.  She is been encouraged to reach out to the office should she have any questions or concerns.  She is not on any medication for cholesterol issues.  Her HDL cholesterol is in good range.  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

## 2020-09-08 RX ORDER — INSULIN LISPRO 100 [IU]/ML
INJECTION, SOLUTION INTRAVENOUS; SUBCUTANEOUS
Qty: 45 ML | Refills: 0 | Status: SHIPPED | OUTPATIENT
Start: 2020-09-08 | End: 2020-11-03 | Stop reason: SDUPTHER

## 2020-09-08 RX ORDER — INSULIN LISPRO 100 [IU]/ML
INJECTION, SOLUTION INTRAVENOUS; SUBCUTANEOUS
Qty: 45 ML | Refills: 0 | Status: SHIPPED | OUTPATIENT
Start: 2020-09-08 | End: 2020-12-19 | Stop reason: SDUPTHER

## 2020-10-15 LAB
25(OH)D3+25(OH)D2 SERPL-MCNC: 49 NG/ML (ref 30–100)
ALBUMIN SERPL-MCNC: 4.7 G/DL (ref 3.5–5.2)
ALBUMIN/GLOB SERPL: 2.1 G/DL
ALP SERPL-CCNC: 66 U/L (ref 39–117)
ALT SERPL-CCNC: 23 U/L (ref 1–33)
AST SERPL-CCNC: 26 U/L (ref 1–32)
BILIRUB SERPL-MCNC: 0.4 MG/DL (ref 0–1.2)
BUN SERPL-MCNC: 11 MG/DL (ref 6–20)
BUN/CREAT SERPL: 12.8 (ref 7–25)
CALCIUM SERPL-MCNC: 9.9 MG/DL (ref 8.6–10.5)
CHLORIDE SERPL-SCNC: 100 MMOL/L (ref 98–107)
CHOLEST SERPL-MCNC: 198 MG/DL (ref 0–200)
CO2 SERPL-SCNC: 26.8 MMOL/L (ref 22–29)
CREAT SERPL-MCNC: 0.86 MG/DL (ref 0.57–1)
FT4I SERPL CALC-MCNC: 2 (ref 1.2–4.9)
GLOBULIN SER CALC-MCNC: 2.2 GM/DL
GLUCOSE SERPL-MCNC: 156 MG/DL (ref 65–99)
HBA1C MFR BLD: 6.9 % (ref 4.8–5.6)
HDLC SERPL-MCNC: 90 MG/DL (ref 40–60)
INTERPRETATION: NORMAL
LDLC SERPL CALC-MCNC: 98 MG/DL (ref 0–100)
Lab: NORMAL
POTASSIUM SERPL-SCNC: 4.9 MMOL/L (ref 3.5–5.2)
PROT SERPL-MCNC: 6.9 G/DL (ref 6–8.5)
SODIUM SERPL-SCNC: 138 MMOL/L (ref 136–145)
T3FREE SERPL-MCNC: 3.4 PG/ML (ref 2–4.4)
T3RU NFR SERPL: 26 % (ref 24–39)
T4 FREE SERPL-MCNC: 1.27 NG/DL (ref 0.93–1.7)
T4 SERPL-MCNC: 7.6 UG/DL (ref 4.5–12)
THYROGLOB AB SERPL-ACNC: <1 IU/ML
THYROGLOB SERPL-MCNC: 2.9 NG/ML
THYROGLOB SERPL-MCNC: NORMAL NG/ML
TRIGL SERPL-MCNC: 53 MG/DL (ref 0–150)
TSH SERPL DL<=0.005 MIU/L-ACNC: 2.52 UIU/ML (ref 0.45–4.5)
VLDLC SERPL CALC-MCNC: 10 MG/DL (ref 5–40)

## 2020-10-29 RX ORDER — ERGOCALCIFEROL 1.25 MG/1
CAPSULE ORAL
Qty: 12 CAPSULE | Refills: 0 | Status: ON HOLD | OUTPATIENT
Start: 2020-10-29 | End: 2023-01-12

## 2020-11-02 NOTE — PROGRESS NOTES
Subjective    Nandini Morrison is a 34 y.o. female. she is here today for follow-up.  Chief Complaint   Patient presents with   • Diabetes     f/u type 1 diabetes, recent labs, dexcom, due for eye exam     You have chosen to receive care through a telephone visit. Do you consent to use a telephone visit for your medical care today?     Total time 15 min     History of Present Illness   Encounter Diagnoses   Name Primary?   • Uncontrolled type 1 diabetes mellitus with hyperglycemia (CMS/HCC) Yes   • Hyperlipidemia, unspecified hyperlipidemia type    • Vitamin D deficiency disease      34-year-old female patient evaluated today via telemedicine for the above diagnoses.  Her sensor download was reviewed.  She is currently on the Dexcom clarity.  She is 55% in target range.  She is high 37% of the time and very high 8% of the time.  She has had low blood sugars 7% of the time.  Her average blood sugar is 154.  Her most recent A1c is 6.9.  She does have occasional excursions of high and low blood sugars but she states the sensor helps her to be more on top of her diabetes management.  Her medication list was reviewed and updated for accuracy.  She denies needing any refills.  She has had no significant hypoglycemic event that required intervention or emergency treatment.  She is needing to make an eye appointment and she goes to [facility].  She denies neuropathy.  Her labs were reviewed and she has access to them on my chart.    The following portions of the patient's history were reviewed and updated as appropriate:   Past Medical History:   Diagnosis Date   • Diabetes mellitus type I (CMS/HCC)    • Vitamin D deficiency      No past surgical history on file.  Family History   Problem Relation Age of Onset   • Diabetes Maternal Aunt    • Hypertension Maternal Aunt      OB History    No obstetric history on file.       Current Outpatient Medications   Medication Sig Dispense Refill   • glucagon (GLUCAGON EMERGENCY) 1  "MG injection Inject 1 mg under the skin into the appropriate area as directed 1 (One) Time As Needed for Low Blood Sugar for up to 1 dose. 1 kit 5   • Insulin Lispro, 1 Unit Dial, (HumaLOG KwikPen) 100 UNIT/ML solution pen-injector INJECT UP TO 50 UNITS DAILY FOR MULTIPLE INJECTIONS 45 mL 0   • Insulin Lispro, 1 Unit Dial, (HumaLOG KwikPen) 100 UNIT/ML solution pen-injector Up to 50 units daily for multiple injections 45 mL 0   • Multiple Vitamins-Minerals (MULTIVITAMIN PO) Take 1 tablet by mouth daily.     • Needle, Disp, (BD DISP NEEDLES) 25G X 5/8\" misc 4 times a day for insulin injection. 400 each 3   • TRESIBA FLEXTOUCH 200 UNIT/ML solution pen-injector pen injection Inject 20 Units under the skin into the appropriate area as directed Daily. 18 mL 1   • vitamin D (ERGOCALCIFEROL) 1.25 MG (70555 UT) capsule capsule TAKE 1 CAPSULE EVERY 7 DAYS 12 capsule 0     No current facility-administered medications for this visit.      Allergies   Allergen Reactions   • Amoxicillin      Social History     Socioeconomic History   • Marital status: Single     Spouse name: Not on file   • Number of children: Not on file   • Years of education: Not on file   • Highest education level: Not on file   Tobacco Use   • Smoking status: Never Smoker   Substance and Sexual Activity   • Alcohol use: Yes     Comment: socially        Review of Systems  Review of Systems   Constitutional: Negative.    Cardiovascular: Negative.    Gastrointestinal: Negative.    Endocrine: Negative.    Psychiatric/Behavioral: Negative for sleep disturbance.        Objective    There were no vitals filed for this visit.    Physical Exam  Constitutional:       General: She is not in acute distress.     Appearance: She is well-developed.      Comments:  pleasant, no distress   Pulmonary:      Effort: Pulmonary effort is normal. No respiratory distress.   Neurological:      Mental Status: She is alert and oriented to person, place, and time.   Psychiatric:       "   Thought Content: Thought content normal.         Lab Review  Sodium (mmol/L)   Date Value   10/13/2020 138   04/13/2020 136   10/16/2019 137     Potassium (mmol/L)   Date Value   10/13/2020 4.9   04/13/2020 4.3   10/16/2019 4.4     Chloride (mmol/L)   Date Value   10/13/2020 100   04/13/2020 99   10/16/2019 98     Total CO2 (mmol/L)   Date Value   10/13/2020 26.8   04/13/2020 23.6   10/16/2019 26.6     BUN (mg/dL)   Date Value   10/13/2020 11   04/13/2020 12   10/16/2019 16     Creatinine (mg/dL)   Date Value   10/13/2020 0.86   04/13/2020 0.85   10/16/2019 0.77     Hemoglobin A1C (%)   Date Value   10/13/2020 6.90 (H)   04/13/2020 7.30 (H)   10/16/2019 7.50 (H)     Triglycerides (mg/dL)   Date Value   10/13/2020 53   04/13/2020 52   10/16/2019 48     LDL Cholesterol  (mg/dL)   Date Value   04/13/2020 113 (H)   10/16/2019 114 (H)   04/17/2019 86   06/29/2015 96     LDL Chol Calc (NIH) (mg/dL)   Date Value   10/13/2020 98       Assessment/Plan      1. Uncontrolled type 1 diabetes mellitus with hyperglycemia (CMS/[facility])    2. Hyperlipidemia, unspecified hyperlipidemia type    3. Vitamin D deficiency disease    .  In Summary patient was evaluated via telemedicine.  Her medications were reviewed and updated.  She denies needing refills she is due for an eye exam and will schedule that.  She is on a Dexcom which was downloaded and reviewed.  Her average blood sugars are 154.  She is 55% in target range.  She does have some excursions of high and low but for the most part she is able to respond her blood sugars and is successful with correcting them.  She does have some low blood sugars that she is able to manage and treat.  She denies any complaints or concerns.  She will follow-up in our office in 6 months.  I will request the staff contact her to schedule an appointment with labs prior  Medications prescribed:  Outpatient Encounter Medications as of 11/3/2020   Medication Sig Dispense Refill   • glucagon (GLUCAGON  "EMERGENCY) 1 MG injection Inject 1 mg under the skin into the appropriate area as directed 1 (One) Time As Needed for Low Blood Sugar for up to 1 dose. 1 kit 5   • Insulin Lispro, 1 Unit Dial, (HumaLOG KwikPen) 100 UNIT/ML solution pen-injector INJECT UP TO 50 UNITS DAILY FOR MULTIPLE INJECTIONS 45 mL 0   • Insulin Lispro, 1 Unit Dial, (HumaLOG KwikPen) 100 UNIT/ML solution pen-injector Up to 50 units daily for multiple injections 45 mL 0   • Multiple Vitamins-Minerals (MULTIVITAMIN PO) Take 1 tablet by mouth daily.     • Needle, Disp, (BD DISP NEEDLES) 25G X 5/8\" misc 4 times a day for insulin injection. 400 each 3   • TRESIBA FLEXTOUCH 200 UNIT/ML solution pen-injector pen injection Inject 20 Units under the skin into the appropriate area as directed Daily. 18 mL 1   • vitamin D (ERGOCALCIFEROL) 1.25 MG (83061 UT) capsule capsule TAKE 1 CAPSULE EVERY 7 DAYS 12 capsule 0     No facility-administered encounter medications on file as of 11/3/2020.        Orders placed during this encounter include:  No orders of the defined types were placed in this encounter.      1. Treatment changes: none    2.  Education: interpretation of lab results    3.  Compliance at present is estimated to be good. Efforts to improve compliance (if necessary) will be directed at regular blood sugar monitoring: daily.    4. Follow-up: No follow-ups on file.    Patient was not counseled about the need to test their blood sugar prior to driving/operating a motor vehicle.               "

## 2020-11-03 ENCOUNTER — OFFICE VISIT (OUTPATIENT)
Dept: ENDOCRINOLOGY | Age: 35
End: 2020-11-03

## 2020-11-03 DIAGNOSIS — E78.5 HYPERLIPIDEMIA, UNSPECIFIED HYPERLIPIDEMIA TYPE: ICD-10-CM

## 2020-11-03 DIAGNOSIS — E55.9 VITAMIN D DEFICIENCY DISEASE: ICD-10-CM

## 2020-11-03 DIAGNOSIS — E10.9 TYPE 1 DIABETES MELLITUS WITHOUT COMPLICATION (HCC): Primary | ICD-10-CM

## 2020-11-03 PROCEDURE — 99442 PR PHYS/QHP TELEPHONE EVALUATION 11-20 MIN: CPT | Performed by: NURSE PRACTITIONER

## 2020-11-03 PROCEDURE — 95251 CONT GLUC MNTR ANALYSIS I&R: CPT | Performed by: NURSE PRACTITIONER

## 2020-11-03 RX ORDER — MONTELUKAST SODIUM 10 MG/1
10 TABLET ORAL
COMMUNITY
Start: 2020-08-21

## 2020-11-03 RX ORDER — BUDESONIDE 90 UG/1
AEROSOL, POWDER RESPIRATORY (INHALATION)
COMMUNITY
Start: 2020-09-24

## 2020-11-03 NOTE — PATIENT INSTRUCTIONS
Continue all your current medications follow-up in 6 months  If you have any problems with regulating your blood sugars feel free to reach out to the office.

## 2020-12-19 RX ORDER — INSULIN LISPRO 100 [IU]/ML
INJECTION, SOLUTION INTRAVENOUS; SUBCUTANEOUS
Qty: 45 ML | Refills: 1 | Status: SHIPPED | OUTPATIENT
Start: 2020-12-19 | End: 2021-07-26 | Stop reason: SDUPTHER

## 2021-02-24 RX ORDER — INSULIN DEGLUDEC 200 U/ML
20 INJECTION, SOLUTION SUBCUTANEOUS DAILY
Qty: 18 ML | Refills: 1 | OUTPATIENT
Start: 2021-02-24

## 2021-02-24 RX ORDER — INSULIN DEGLUDEC 200 U/ML
20 INJECTION, SOLUTION SUBCUTANEOUS DAILY
Qty: 18 ML | Refills: 1 | Status: SHIPPED | OUTPATIENT
Start: 2021-02-24 | End: 2021-07-26 | Stop reason: SDUPTHER

## 2021-07-26 RX ORDER — INSULIN DEGLUDEC 200 U/ML
20 INJECTION, SOLUTION SUBCUTANEOUS DAILY
Qty: 18 ML | Refills: 0 | Status: SHIPPED | OUTPATIENT
Start: 2021-07-26 | End: 2022-03-09 | Stop reason: SDUPTHER

## 2021-07-26 RX ORDER — INSULIN LISPRO 100 [IU]/ML
INJECTION, SOLUTION INTRAVENOUS; SUBCUTANEOUS
Qty: 45 ML | Refills: 0 | Status: SHIPPED | OUTPATIENT
Start: 2021-07-26 | End: 2022-03-09

## 2021-08-06 RX ORDER — INSULIN ASPART 100 [IU]/ML
INJECTION, SOLUTION INTRAVENOUS; SUBCUTANEOUS
Qty: 45 ML | Refills: 0 | Status: SHIPPED | OUTPATIENT
Start: 2021-08-06 | End: 2021-12-21

## 2021-08-06 NOTE — TELEPHONE ENCOUNTER
Patient called    Her insurance will not pay for the Humalog. The insurance will approve Novalog if it's filled through [facility].    Can we send the order in?    Patient's phone: [phone]

## 2021-09-07 ENCOUNTER — OFFICE VISIT (OUTPATIENT)
Dept: ENDOCRINOLOGY | Age: 36
End: 2021-09-07

## 2021-09-07 VITALS
WEIGHT: 198.8 LBS | BODY MASS INDEX: 31.2 KG/M2 | DIASTOLIC BLOOD PRESSURE: 78 MMHG | SYSTOLIC BLOOD PRESSURE: 124 MMHG | HEIGHT: 67 IN

## 2021-09-07 DIAGNOSIS — E10.65 TYPE 1 DIABETES MELLITUS WITH HYPERGLYCEMIA (HCC): Primary | ICD-10-CM

## 2021-09-07 PROCEDURE — 95251 CONT GLUC MNTR ANALYSIS I&R: CPT | Performed by: NURSE PRACTITIONER

## 2021-09-07 PROCEDURE — 99213 OFFICE O/P EST LOW 20 MIN: CPT | Performed by: NURSE PRACTITIONER

## 2021-09-07 RX ORDER — COVID-19 MOLECULAR TEST ASSAY
KIT MISCELLANEOUS SEE ADMIN INSTRUCTIONS
COMMUNITY
Start: 2021-08-19 | End: 2023-03-09

## 2021-09-07 NOTE — PROGRESS NOTES
"Chief Complaint  Diabetes    Subjective          Nandini Morrison presents to [facility] ENDOCRINOLOGY  History of Present Illness     I have reviewed PMH, allergies and medications UTD at this visit      Type 1 dm    Diagnosed at age 16  Today in clinic pt reports being on NovoLog, Tresiba 180-20u daily  FBG - 150s  Pre meals - 120-180  Sensor -dexcom  6% very high  23% high  62% time in range  7% low (70-80)  2% very low (<70)  Blood sugars are somewhat study from 12 AM to 12 PM, with the highest spike around 3 PM  Noted blood sugars between 70-80 occur from 12 AM to 6 AM  Dm retinopathy - X  ,Last eye exam - UTD 2020  Dm nephropathy - X  Dm neuropathy - denies   CAD - X  CVA - X  Episodes of hypoglycemia - nothing below 80,   Pt is physically active. weight has been stable.   Pt tries to follow DM diet for most part.   No recent hospitalizations       Objective   Vital Signs:   /78 (BP Location: Right arm, Patient Position: Sitting, Cuff Size: Adult)   Ht 170.2 cm (67.01\")   Wt 90.2 kg (198 lb 12.8 oz)   BMI 31.13 kg/m²     Physical Exam  Vitals reviewed.   Constitutional:       General: She is not in acute distress.  HENT:      Head: Normocephalic and atraumatic.   Cardiovascular:      Rate and Rhythm: Normal rate and regular rhythm.   Pulmonary:      Effort: Pulmonary effort is normal. No respiratory distress.   Musculoskeletal:         General: No signs of injury. Normal range of motion.      Cervical back: Normal range of motion and neck supple.   Skin:     General: Skin is warm and dry.   Neurological:      Mental Status: She is alert and oriented to person, place, and time. Mental status is at baseline.   Psychiatric:         Mood and Affect: Mood normal.         Behavior: Behavior normal.         Thought Content: Thought content normal.         Judgment: Judgment normal.        Result Review :   The following data was reviewed by: JOSH Raines on 09/07/2021:  Common labs  "   Common Labsle 10/13/20 10/13/20 10/13/20    0845 0845 0845   Glucose 156 (A)     BUN 11     Creatinine 0.86     eGFR Non  Am 76     eGFR  Am 92     Sodium 138     Potassium 4.9     Chloride 100     Calcium 9.9     Total Protein 6.9     Albumin 4.70     Total Bilirubin 0.4     Alkaline Phosphatase 66     AST (SGOT) 26     ALT (SGPT) 23     Total Cholesterol  198    Triglycerides  53    HDL Cholesterol  90 (A)    LDL Cholesterol   98    Hemoglobin A1C   6.90 (A)   (A) Abnormal value       Comments are available for some flowsheets but are not being displayed.                     Assessment and Plan    Diagnoses and all orders for this visit:    1. Type 1 diabetes mellitus with hyperglycemia (CMS/[facility]) (Primary)  -     Hemoglobin A1c  -     Comprehensive Metabolic Panel  -     Lipid Panel  -     Microalbumin / Creatinine Urine Ratio - Urine, Clean Catch  -     Hemoglobin A1c; Future  -     Comprehensive Metabolic Panel; Future  -     Lipid Panel; Future  -     Microalbumin / Creatinine Urine Ratio - Urine, Clean Catch; Future  -     TSH; Future  -     T4, Free; Future        Follow Up   Return in about 6 months (around 3/7/2022).     Goal A1c less than 7%  Continue Dexcom, goal time in range greater than 70%  Hypoglycemia awareness, education and prevention  Continue Tresiba and Humalog until labs are back  Not currently on ACE or statin    Patient was given instructions and counseling regarding her condition or for health maintenance advice. Please see specific information pulled into the AVS if appropriate.     JOSH Raines

## 2021-09-08 LAB
ALBUMIN SERPL-MCNC: 4.4 G/DL (ref 3.8–4.8)
ALBUMIN/CREAT UR: 9 MG/G CREAT (ref 0–29)
ALBUMIN/GLOB SERPL: 1.9 {RATIO} (ref 1.2–2.2)
ALP SERPL-CCNC: 63 IU/L (ref 48–121)
ALT SERPL-CCNC: 18 IU/L (ref 0–32)
AST SERPL-CCNC: 23 IU/L (ref 0–40)
BILIRUB SERPL-MCNC: 0.4 MG/DL (ref 0–1.2)
BUN SERPL-MCNC: 11 MG/DL (ref 6–20)
BUN/CREAT SERPL: 15 (ref 9–23)
CALCIUM SERPL-MCNC: 9.4 MG/DL (ref 8.7–10.2)
CHLORIDE SERPL-SCNC: 104 MMOL/L (ref 96–106)
CHOLEST SERPL-MCNC: 177 MG/DL (ref 100–199)
CO2 SERPL-SCNC: 23 MMOL/L (ref 20–29)
CREAT SERPL-MCNC: 0.75 MG/DL (ref 0.57–1)
CREAT UR-MCNC: 165 MG/DL
GLOBULIN SER CALC-MCNC: 2.3 G/DL (ref 1.5–4.5)
GLUCOSE SERPL-MCNC: 133 MG/DL (ref 65–99)
HBA1C MFR BLD: 6.5 % (ref 4.8–5.6)
HDLC SERPL-MCNC: 68 MG/DL
IMP & REVIEW OF LAB RESULTS: NORMAL
LDLC SERPL CALC-MCNC: 99 MG/DL (ref 0–99)
MICROALBUMIN UR-MCNC: 14.8 UG/ML
POTASSIUM SERPL-SCNC: 5.1 MMOL/L (ref 3.5–5.2)
PROT SERPL-MCNC: 6.7 G/DL (ref 6–8.5)
SODIUM SERPL-SCNC: 141 MMOL/L (ref 134–144)
TRIGL SERPL-MCNC: 52 MG/DL (ref 0–149)
VLDLC SERPL CALC-MCNC: 10 MG/DL (ref 5–40)

## 2021-09-17 ENCOUNTER — TELEPHONE (OUTPATIENT)
Dept: ENDOCRINOLOGY | Age: 36
End: 2021-09-17

## 2021-09-17 NOTE — TELEPHONE ENCOUNTER
[facility] needs chart notes     Not from 2019 that they received but the most recent sept 21    Fax to [phone]      For sensors for the g6 cgm

## 2021-12-21 RX ORDER — INSULIN ASPART 100 [IU]/ML
INJECTION, SOLUTION INTRAVENOUS; SUBCUTANEOUS
Qty: 45 ML | Refills: 0 | Status: SHIPPED | OUTPATIENT
Start: 2021-12-21 | End: 2022-03-09 | Stop reason: SDUPTHER

## 2022-03-02 ENCOUNTER — DOCUMENTATION (OUTPATIENT)
Dept: ENDOCRINOLOGY | Age: 37
End: 2022-03-02

## 2022-03-02 NOTE — PROGRESS NOTES
Benefits Investigation Summary    Prescription: Renewal     Dispensing [facility]    Copay amount: tresiba $114    [facility]  BIN: [id number]  PCN: [id number]  GROUP: [id number]    Prior Auth and Med Assistance notes:

## 2022-03-09 ENCOUNTER — OFFICE VISIT (OUTPATIENT)
Dept: ENDOCRINOLOGY | Age: 37
End: 2022-03-09

## 2022-03-09 VITALS
OXYGEN SATURATION: 99 % | HEIGHT: 67 IN | HEART RATE: 89 BPM | BODY MASS INDEX: 30.57 KG/M2 | DIASTOLIC BLOOD PRESSURE: 70 MMHG | WEIGHT: 194.8 LBS | SYSTOLIC BLOOD PRESSURE: 122 MMHG

## 2022-03-09 DIAGNOSIS — E10.65 TYPE 1 DIABETES MELLITUS WITH HYPERGLYCEMIA: Primary | ICD-10-CM

## 2022-03-09 PROCEDURE — 95251 CONT GLUC MNTR ANALYSIS I&R: CPT | Performed by: NURSE PRACTITIONER

## 2022-03-09 PROCEDURE — 99213 OFFICE O/P EST LOW 20 MIN: CPT | Performed by: NURSE PRACTITIONER

## 2022-03-09 RX ORDER — INSULIN DEGLUDEC 200 U/ML
20 INJECTION, SOLUTION SUBCUTANEOUS DAILY
Qty: 18 ML | Refills: 0 | Status: SHIPPED | OUTPATIENT
Start: 2022-03-09 | End: 2022-12-08

## 2022-03-09 RX ORDER — INSULIN ASPART 100 [IU]/ML
INJECTION, SOLUTION INTRAVENOUS; SUBCUTANEOUS
Qty: 45 ML | Refills: 0 | Status: SHIPPED | OUTPATIENT
Start: 2022-03-09 | End: 2022-12-08

## 2022-03-09 NOTE — PROGRESS NOTES
"Chief Complaint  Diabetes    Subjective          Nandini Morrison presents to [facility] ENDOCRINOLOGY  History of Present Illness     I have reviewed PMH, allergies and medications UTD at this visit       Type 1 dm    Diagnosed at age 16  Today in clinic pt reports being on NovoLog 1u-8-10g and 50/100 1u, Tresiba 16-18u daily  FBG - 150s  Pre meals - 120-180  Sensor -dexcom  Average glucose 141  Low 13%  Time in range 59%  High 27%  Transfer low blood sugar occur from 10 AM to 4 PM  Transfer high blood sugar occur from 3 PM to 10 PM  Dm retinopathy - X  ,Last eye exam - last week  Dm nephropathy - X  Dm neuropathy - denies   CAD - X  CVA - X  Episodes of hypoglycemia - yes  Pt is physically active. weight has been stable.   Pt tries to follow DM diet for most part.   No recent hospitalizations      Objective   Vital Signs:   /70   Pulse 89   Ht 170.2 cm (67\")   Wt 88.4 kg (194 lb 12.8 oz)   SpO2 99%   BMI 30.51 kg/m²       Physical Exam  Vitals reviewed.   Constitutional:       General: She is not in acute distress.  HENT:      Head: Normocephalic and atraumatic.   Cardiovascular:      Rate and Rhythm: Normal rate and regular rhythm.   Pulmonary:      Effort: Pulmonary effort is normal. No respiratory distress.   Musculoskeletal:         General: No signs of injury. Normal range of motion.      Cervical back: Normal range of motion and neck supple.   Skin:     General: Skin is warm and dry.   Neurological:      Mental Status: She is alert and oriented to person, place, and time. Mental status is at baseline.   Psychiatric:         Mood and Affect: Mood normal.         Behavior: Behavior normal.         Thought Content: Thought content normal.         Judgment: Judgment normal.            Result Review :   The following data was reviewed by: JOSH Raines on 03/09/2022:  Common labs    Common Labsle 9/7/21 9/7/21 9/7/21 9/7/21    1505 1505 1505 1505   Glucose  133 (A)     BUN  11   "   Creatinine  0.75     eGFR Non  Am  104     eGFR African Am  119     Sodium  141     Potassium  5.1     Chloride  104     Calcium  9.4     Total Protein  6.7     Albumin  4.4     Total Bilirubin  0.4     Alkaline Phosphatase  63     AST (SGOT)  23     ALT (SGPT)  18     Total Cholesterol   177    Triglycerides   52    HDL Cholesterol   68    LDL Cholesterol    99    Hemoglobin A1C 6.5 (A)      Microalbumin, Urine    14.8   (A) Abnormal value       Comments are available for some flowsheets but are not being displayed.                     Assessment and Plan    Diagnoses and all orders for this visit:    1. Type 1 diabetes mellitus with hyperglycemia (HCC) (Primary)  -     Hemoglobin A1c  -     Comprehensive Metabolic Panel  -     Lipid Panel  -     Microalbumin / Creatinine Urine Ratio - Urine, Clean Catch    Other orders  -     Tresiba FlexTouch 200 UNIT/ML solution pen-injector pen injection; Inject 20 Units under the skin into the appropriate area as directed Daily.  Dispense: 18 mL; Refill: 0  -     insulin aspart (NovoLOG FlexPen) 100 UNIT/ML solution pen-injector sc pen; INJECT UP TO 50 UNITS      SUBCUTANEOUSLY DAILY  Dispense: 45 mL; Refill: 0        Follow Up   No follow-ups on file.     Labs today  Improve hypoglycemia prevention  Yearly diabetic eye exams  Daily diabetic foot care  Diabetic diet  Routine physical activity as tolerated  Not interested in insulin pump or smart pens    Patient was given instructions and counseling regarding her condition or for health maintenance advice. Please see specific information pulled into the AVS if appropriate.     JOSH Raines

## 2022-03-10 LAB
ALBUMIN SERPL-MCNC: 4.2 G/DL (ref 3.8–4.8)
ALBUMIN/CREAT UR: 10 MG/G CREAT (ref 0–29)
ALBUMIN/GLOB SERPL: 1.7 {RATIO} (ref 1.2–2.2)
ALP SERPL-CCNC: 58 IU/L (ref 44–121)
ALT SERPL-CCNC: 24 IU/L (ref 0–32)
AST SERPL-CCNC: 29 IU/L (ref 0–40)
BILIRUB SERPL-MCNC: 0.4 MG/DL (ref 0–1.2)
BUN SERPL-MCNC: 15 MG/DL (ref 6–20)
BUN/CREAT SERPL: 19 (ref 9–23)
CALCIUM SERPL-MCNC: 9.8 MG/DL (ref 8.7–10.2)
CHLORIDE SERPL-SCNC: 99 MMOL/L (ref 96–106)
CHOLEST SERPL-MCNC: 188 MG/DL (ref 100–199)
CO2 SERPL-SCNC: 23 MMOL/L (ref 20–29)
CREAT SERPL-MCNC: 0.81 MG/DL (ref 0.57–1)
CREAT UR-MCNC: 148 MG/DL
EGFR GENE MUT ANL BLD/T: 96 ML/MIN/1.73
GLOBULIN SER CALC-MCNC: 2.5 G/DL (ref 1.5–4.5)
GLUCOSE SERPL-MCNC: 109 MG/DL (ref 65–99)
HBA1C MFR BLD: 6.9 % (ref 4.8–5.6)
HDLC SERPL-MCNC: 81 MG/DL
IMP & REVIEW OF LAB RESULTS: NORMAL
LDLC SERPL CALC-MCNC: 96 MG/DL (ref 0–99)
MICROALBUMIN UR-MCNC: 14.5 UG/ML
POTASSIUM SERPL-SCNC: 5.1 MMOL/L (ref 3.5–5.2)
PROT SERPL-MCNC: 6.7 G/DL (ref 6–8.5)
REPORT: NORMAL
SODIUM SERPL-SCNC: 139 MMOL/L (ref 134–144)
T4 FREE SERPL-MCNC: 1.37 NG/DL (ref 0.82–1.77)
TRIGL SERPL-MCNC: 58 MG/DL (ref 0–149)
TSH SERPL DL<=0.005 MIU/L-ACNC: 2.95 UIU/ML (ref 0.45–4.5)
VLDLC SERPL CALC-MCNC: 11 MG/DL (ref 5–40)

## 2022-03-23 RX ORDER — PROCHLORPERAZINE 25 MG/1
1 SUPPOSITORY RECTAL
Qty: 1 EACH | Refills: 4 | Status: SHIPPED | OUTPATIENT
Start: 2022-03-23

## 2022-03-23 RX ORDER — PROCHLORPERAZINE 25 MG/1
SUPPOSITORY RECTAL
Qty: 9 EACH | Refills: 4 | Status: SHIPPED | OUTPATIENT
Start: 2022-03-23

## 2022-03-28 ENCOUNTER — TELEPHONE (OUTPATIENT)
Dept: ENDOCRINOLOGY | Age: 37
End: 2022-03-28

## 2022-09-09 ENCOUNTER — OFFICE VISIT (OUTPATIENT)
Dept: ENDOCRINOLOGY | Age: 37
End: 2022-09-09

## 2022-09-09 VITALS
SYSTOLIC BLOOD PRESSURE: 110 MMHG | BODY MASS INDEX: 30.7 KG/M2 | HEART RATE: 87 BPM | HEIGHT: 67 IN | OXYGEN SATURATION: 97 % | DIASTOLIC BLOOD PRESSURE: 70 MMHG | WEIGHT: 195.6 LBS | TEMPERATURE: 97.8 F

## 2022-09-09 DIAGNOSIS — E55.9 VITAMIN D DEFICIENCY DISEASE: ICD-10-CM

## 2022-09-09 DIAGNOSIS — E10.65 TYPE 1 DIABETES MELLITUS WITH HYPERGLYCEMIA: Primary | ICD-10-CM

## 2022-09-09 PROCEDURE — 95251 CONT GLUC MNTR ANALYSIS I&R: CPT | Performed by: INTERNAL MEDICINE

## 2022-09-09 PROCEDURE — 99214 OFFICE O/P EST MOD 30 MIN: CPT | Performed by: INTERNAL MEDICINE

## 2022-09-09 RX ORDER — ALBUTEROL SULFATE 90 UG/1
AEROSOL, METERED RESPIRATORY (INHALATION)
COMMUNITY
Start: 2022-08-25

## 2022-09-09 NOTE — PROGRESS NOTES
"Chief Complaint  Type I DM     Subjective          History of Present Illness    Nandini Morrison 36 y.o. presents with Type 1 dm as a f/u patient.     Type 1 dm - Diagnosed for about   Today in clinic pt reports being on tresiba 18 - 20 units in the am - but she works night shifts ( bed time),  Novolog 1 unit for 10 gm of carbs, 1 unit for 50 over 100 mg/dl.   Avg bg - 136 mg/dl.   Checks BG - 4 - 5 times/day  Insulin pump - x  Sensor - yes  Dm retinopathy - x,Last eye exam -   Dm nephropathy - x  Dm neuropathy -x ,Dm neuropathy meds - n/a  CAD -x  CVA -x  Episodes of hypoglycemia - yes.  Pt is physically active. weight has been stable.   Pt tries to follow DM diet for most part.   Prior DKA episodes - None recently.     Does report that her menstrual cycles are regular, does not want to be on an insulin pump as she does not want too many things to be attached on her body.    Reviewed primary care physician's/consulting physician documentation and lab results         I have reviewed the patient's allergies, medicines, past medical hx, family hx and social hx in detail.    Objective   Vital Signs:   /70   Pulse 87   Temp 97.8 °F (36.6 °C) (Temporal)   Ht 170.2 cm (67\")   Wt 88.7 kg (195 lb 9.6 oz)   SpO2 97%   BMI 30.64 kg/m²   Physical Exam   General appearance - no distress  Eyes- anicteric sclera  Ear nose and throat-external ears and nose normal.    Respiratory-normal chest on inspection.  No respiratory distress noted.  Skin-no rashes.  Neuro-alert and oriented x3            Result Review :   The following data was reviewed by: Delaney Fields MD on 09/09/2022:  Office Visit on 03/09/2022   Component Date Value Ref Range Status   • Hemoglobin A1C 03/09/2022 6.9 (A) 4.8 - 5.6 % Final    Comment:          Prediabetes: 5.7 - 6.4           Diabetes: >6.4           Glycemic control for adults with diabetes: <7.0     • Glucose 03/09/2022 109 (A) 65 - 99 mg/dL Final   • BUN 03/09/2022 15  6 - 20 mg/dL Final "   • Creatinine 03/09/2022 0.81  0.57 - 1.00 mg/dL Final   • EGFR Result 03/09/2022 96  >59 mL/min/1.73 Final   • BUN/Creatinine Ratio 03/09/2022 19  9 - 23 Final   • Sodium 03/09/2022 139  134 - 144 mmol/L Final   • Potassium 03/09/2022 5.1  3.5 - 5.2 mmol/L Final   • Chloride 03/09/2022 99  96 - 106 mmol/L Final   • Total CO2 03/09/2022 23  20 - 29 mmol/L Final   • Calcium 03/09/2022 9.8  8.7 - 10.2 mg/dL Final   • Total Protein 03/09/2022 6.7  6.0 - 8.5 g/dL Final   • Albumin 03/09/2022 4.2  3.8 - 4.8 g/dL Final   • Globulin 03/09/2022 2.5  1.5 - 4.5 g/dL Final   • A/G Ratio 03/09/2022 1.7  1.2 - 2.2 Final   • Total Bilirubin 03/09/2022 0.4  0.0 - 1.2 mg/dL Final   • Alkaline Phosphatase 03/09/2022 58  44 - 121 IU/L Final   • AST (SGOT) 03/09/2022 29  0 - 40 IU/L Final   • ALT (SGPT) 03/09/2022 24  0 - 32 IU/L Final   • Total Cholesterol 03/09/2022 188  100 - 199 mg/dL Final   • Triglycerides 03/09/2022 58  0 - 149 mg/dL Final   • HDL Cholesterol 03/09/2022 81  >39 mg/dL Final   • VLDL Cholesterol Chetan 03/09/2022 11  5 - 40 mg/dL Final   • LDL Chol Calc (NIH) 03/09/2022 96  0 - 99 mg/dL Final   • Creatinine, Urine 03/09/2022 148.0  Not Estab. mg/dL Final   • Microalbumin, Urine 03/09/2022 14.5  Not Estab. ug/mL Final   • Microalbumin/Creatinine Ratio 03/09/2022 10  0 - 29 mg/g creat Final    Comment:                        Normal:                0 -  29                         Moderately increased: 30 - 300                         Severely increased:       >300     • Interpretation 03/09/2022 Note   Final    Supplemental report is available.   • Interpretation 03/09/2022 Note   Final    Comment: Medical Director's Note: Effective 2/28/2022, [facility] uses  the 2021 CKD-EPI creatinine equation without a race factor  to calculate and report eGFR results. eGFR results reported  prior to this date using the 2009 CKD-EPI equation are no  longer included in this report  interpretation.  -------------------------------  CHRONIC KIDNEY DISEASE:  EGFR, BLOOD PRESSURE, AND PROTEINURIA ASSESSMENT  Estimated GFR is 60 mL/min/1.73mE2 or above; this patient is  not presently in CKD stage 3 or higher, therefore we are  unable to provide suggestions for treatment or follow-up.  -------------------------------  DISCLAIMER  These assessments and treatment suggestions are provided as  a convenience in support of the physician-patient  relationship and are not intended to replace the physician's  clinical judgment. They are derived from national guidelines  in addition to other evidence and expert opinion. The  clinician should consider this information within the  context of clinical opinion and th                           e individual patient.  SEE GUIDANCE FOR CHRONIC KIDNEY DISEASE PROGRAM: Kidney  Disease Improving Global Outcomes (KDIGO) clinical practice  guidelines are at http://kdigo.org/home/guidelines/.  National Kid[employer] Kidney Disease Outcomes Quality  Initiative (KDOQI (TM)), with its limitations and  disclaimers, are at www.kidney.org/professionals/KDOQI. This  program is intended for patients who have been diagnosed  with stages 3, 4, or pre-dialysis 5 CKD. It is not intended  for children, pregnant patients, or transplant patients.     • Free T4 03/09/2022 1.37  0.82 - 1.77 ng/dL Final   • TSH 03/09/2022 2.950  0.450 - 4.500 uIU/mL Final     Data reviewed: PCP notes and endocrine notes       Results Review:    I reviewed the patient's new clinical results.     Assessment and Plan    Problem List Items Addressed This Visit        Other    Diabetes mellitus type I (HCC) - Primary    Relevant Orders    TSH    T4, Free    Basic Metabolic Panel    Hemoglobin A1c    Lipid Panel    Vitamin B12 & Folate    Vitamin D 25 Hydroxy    Microalbumin / Creatinine Urine Ratio - Urine, Clean Catch    Vitamin D deficiency disease    Relevant Orders    TSH    T4, Free    Basic Metabolic  "Panel    Hemoglobin A1c    Lipid Panel    Vitamin B12 & Folate    Vitamin D 25 Hydroxy    Microalbumin / Creatinine Urine Ratio - Urine, Clean Catch        Type 1 diabetes mellitus-uncontrolled with hyperglycemia  Check HbA1c  Continue the Tresiba at the above dosage  Advised the patient to use the NovoLog sliding scale of 1 unit for 50/150 especially at midnight at around 2 AM-3 AM as she has been noted to have some low blood sugars after treating for the blood sugars at that time.    Interpreted the information of the CGM after downloading it and made the current insulin changes.  Avg bg -132 mg/dL range  Trends noted -as mentioned above    Vitamin D deficiency  Continue vitamin D replacement    Patient was seen by [facility] nurse practitioner prior to me.  All the above problems are new for me    Interpreted the blood work-up/imaging results performed by the primary care/consulting physician -    Refills sent to pharmacy    Follow Up     Patient was given instructions and counseling regarding her condition or for health maintenance advice. Please see specific information pulled into the AVS if appropriate.       Thank you for asking me to see your patient, Nandini Morrison in consultation.         Delaney Fields MD  09/09/22      EMR Dragon / transcription disclaimer:     \"Dictated utilizing Dragon dictation\".               "

## 2022-09-10 LAB
25(OH)D3+25(OH)D2 SERPL-MCNC: 96.6 NG/ML (ref 30–100)
ALBUMIN/CREAT UR: 16 MG/G CREAT (ref 0–29)
BUN SERPL-MCNC: 7 MG/DL (ref 6–20)
BUN/CREAT SERPL: 9 (ref 9–23)
CALCIUM SERPL-MCNC: 9.8 MG/DL (ref 8.7–10.2)
CHLORIDE SERPL-SCNC: 100 MMOL/L (ref 96–106)
CHOLEST SERPL-MCNC: 192 MG/DL (ref 100–199)
CO2 SERPL-SCNC: 21 MMOL/L (ref 20–29)
CREAT SERPL-MCNC: 0.8 MG/DL (ref 0.57–1)
CREAT UR-MCNC: 55.5 MG/DL
EGFRCR-CYS SERPLBLD CKD-EPI 2021: 98 ML/MIN/1.73
FOLATE SERPL-MCNC: 17.4 NG/ML
GLUCOSE SERPL-MCNC: 86 MG/DL (ref 65–99)
HBA1C MFR BLD: 6.3 % (ref 4.8–5.6)
HDLC SERPL-MCNC: 74 MG/DL
IMP & REVIEW OF LAB RESULTS: NORMAL
LDLC SERPL CALC-MCNC: 110 MG/DL (ref 0–99)
MICROALBUMIN UR-MCNC: 8.8 UG/ML
POTASSIUM SERPL-SCNC: 4.4 MMOL/L (ref 3.5–5.2)
SODIUM SERPL-SCNC: 139 MMOL/L (ref 134–144)
T4 FREE SERPL-MCNC: 1.23 NG/DL (ref 0.82–1.77)
TRIGL SERPL-MCNC: 43 MG/DL (ref 0–149)
TSH SERPL DL<=0.005 MIU/L-ACNC: 2.2 UIU/ML (ref 0.45–4.5)
VIT B12 SERPL-MCNC: 1019 PG/ML (ref 232–1245)
VLDLC SERPL CALC-MCNC: 8 MG/DL (ref 5–40)

## 2022-12-08 RX ORDER — INSULIN DEGLUDEC 200 U/ML
INJECTION, SOLUTION SUBCUTANEOUS
Qty: 9 ML | Refills: 1 | Status: SHIPPED | OUTPATIENT
Start: 2022-12-08

## 2022-12-08 RX ORDER — INSULIN ASPART 100 [IU]/ML
INJECTION, SOLUTION INTRAVENOUS; SUBCUTANEOUS
Qty: 45 ML | Refills: 0 | Status: SHIPPED | OUTPATIENT
Start: 2022-12-08 | End: 2023-03-09

## 2022-12-20 ENCOUNTER — HOSPITAL ENCOUNTER (EMERGENCY)
Facility: HOSPITAL | Age: 37
Discharge: HOME | End: 2022-12-20
Attending: EMERGENCY MEDICINE | Admitting: EMERGENCY MEDICINE

## 2022-12-20 ENCOUNTER — APPOINTMENT (OUTPATIENT)
Dept: CT IMAGING | Facility: HOSPITAL | Age: 37
End: 2022-12-20

## 2022-12-20 VITALS
RESPIRATION RATE: 18 BRPM | OXYGEN SATURATION: 97 % | DIASTOLIC BLOOD PRESSURE: 93 MMHG | BODY MASS INDEX: 31.39 KG/M2 | HEIGHT: 67 IN | HEART RATE: 99 BPM | SYSTOLIC BLOOD PRESSURE: 147 MMHG | TEMPERATURE: 98.2 F | WEIGHT: 200 LBS

## 2022-12-20 DIAGNOSIS — N20.1 URETERAL CALCULUS: ICD-10-CM

## 2022-12-20 DIAGNOSIS — R10.9 RIGHT SIDED ABDOMINAL PAIN: Primary | ICD-10-CM

## 2022-12-20 DIAGNOSIS — N13.2 HYDRONEPHROSIS WITH URINARY OBSTRUCTION DUE TO RENAL CALCULUS: ICD-10-CM

## 2022-12-20 LAB
ALBUMIN SERPL-MCNC: 4.1 G/DL (ref 3.5–5.2)
ALBUMIN/GLOB SERPL: 1.4 G/DL
ALP SERPL-CCNC: 60 U/L (ref 39–117)
ALT SERPL W P-5'-P-CCNC: 23 U/L (ref 1–33)
ANION GAP SERPL CALCULATED.3IONS-SCNC: 10.6 MMOL/L (ref 5–15)
AST SERPL-CCNC: 21 U/L (ref 1–32)
BACTERIA UR QL AUTO: ABNORMAL /HPF
BASOPHILS # BLD AUTO: 0.06 10*3/MM3 (ref 0–0.2)
BASOPHILS NFR BLD AUTO: 0.9 % (ref 0–1.5)
BILIRUB SERPL-MCNC: 0.7 MG/DL (ref 0–1.2)
BILIRUB UR QL STRIP: NEGATIVE
BUN SERPL-MCNC: 9 MG/DL (ref 6–20)
BUN/CREAT SERPL: 11.5 (ref 7–25)
CALCIUM SPEC-SCNC: 9.2 MG/DL (ref 8.6–10.5)
CHLORIDE SERPL-SCNC: 104 MMOL/L (ref 98–107)
CLARITY UR: ABNORMAL
CO2 SERPL-SCNC: 26.4 MMOL/L (ref 22–29)
COD CRY URNS QL: ABNORMAL /HPF
COLOR UR: YELLOW
CREAT SERPL-MCNC: 0.78 MG/DL (ref 0.57–1)
DEPRECATED RDW RBC AUTO: 44.3 FL (ref 37–54)
EGFRCR SERPLBLD CKD-EPI 2021: 101.1 ML/MIN/1.73
EOSINOPHIL # BLD AUTO: 0.11 10*3/MM3 (ref 0–0.4)
EOSINOPHIL NFR BLD AUTO: 1.6 % (ref 0.3–6.2)
ERYTHROCYTE [DISTWIDTH] IN BLOOD BY AUTOMATED COUNT: 12 % (ref 12.3–15.4)
GLOBULIN UR ELPH-MCNC: 2.9 GM/DL
GLUCOSE SERPL-MCNC: 200 MG/DL (ref 65–99)
GLUCOSE UR STRIP-MCNC: NEGATIVE MG/DL
HCG SERPL QL: NEGATIVE
HCT VFR BLD AUTO: 39.8 % (ref 34–46.6)
HGB BLD-MCNC: 12.9 G/DL (ref 12–15.9)
HGB UR QL STRIP.AUTO: ABNORMAL
HOLD SPECIMEN: NORMAL
HOLD SPECIMEN: NORMAL
HYALINE CASTS UR QL AUTO: ABNORMAL /LPF
IMM GRANULOCYTES # BLD AUTO: 0.02 10*3/MM3 (ref 0–0.05)
IMM GRANULOCYTES NFR BLD AUTO: 0.3 % (ref 0–0.5)
KETONES UR QL STRIP: ABNORMAL
LEUKOCYTE ESTERASE UR QL STRIP.AUTO: ABNORMAL
LIPASE SERPL-CCNC: 32 U/L (ref 13–60)
LYMPHOCYTES # BLD AUTO: 1 10*3/MM3 (ref 0.7–3.1)
LYMPHOCYTES NFR BLD AUTO: 14.3 % (ref 19.6–45.3)
MCH RBC QN AUTO: 32.2 PG (ref 26.6–33)
MCHC RBC AUTO-ENTMCNC: 32.4 G/DL (ref 31.5–35.7)
MCV RBC AUTO: 99.3 FL (ref 79–97)
MONOCYTES # BLD AUTO: 0.54 10*3/MM3 (ref 0.1–0.9)
MONOCYTES NFR BLD AUTO: 7.7 % (ref 5–12)
NEUTROPHILS NFR BLD AUTO: 5.28 10*3/MM3 (ref 1.7–7)
NEUTROPHILS NFR BLD AUTO: 75.2 % (ref 42.7–76)
NITRITE UR QL STRIP: NEGATIVE
NRBC BLD AUTO-RTO: 0 /100 WBC (ref 0–0.2)
PH UR STRIP.AUTO: 5.5 [PH] (ref 5–8)
PLATELET # BLD AUTO: 251 10*3/MM3 (ref 140–450)
PMV BLD AUTO: 9.3 FL (ref 6–12)
POTASSIUM SERPL-SCNC: 4.3 MMOL/L (ref 3.5–5.2)
PROT SERPL-MCNC: 7 G/DL (ref 6–8.5)
PROT UR QL STRIP: ABNORMAL
RBC # BLD AUTO: 4.01 10*6/MM3 (ref 3.77–5.28)
RBC # UR STRIP: ABNORMAL /HPF
REF LAB TEST METHOD: ABNORMAL
SODIUM SERPL-SCNC: 141 MMOL/L (ref 136–145)
SP GR UR STRIP: 1.02 (ref 1–1.03)
SQUAMOUS #/AREA URNS HPF: ABNORMAL /HPF
UROBILINOGEN UR QL STRIP: ABNORMAL
WBC # UR STRIP: ABNORMAL /HPF
WBC NRBC COR # BLD: 7.01 10*3/MM3 (ref 3.4–10.8)
WHOLE BLOOD HOLD COAG: NORMAL
WHOLE BLOOD HOLD SPECIMEN: NORMAL

## 2022-12-20 PROCEDURE — 80053 COMPREHEN METABOLIC PANEL: CPT

## 2022-12-20 PROCEDURE — 83690 ASSAY OF LIPASE: CPT

## 2022-12-20 PROCEDURE — 84703 CHORIONIC GONADOTROPIN ASSAY: CPT

## 2022-12-20 PROCEDURE — 25010000002 IOPAMIDOL 61 % SOLUTION: Performed by: EMERGENCY MEDICINE

## 2022-12-20 PROCEDURE — 81001 URINALYSIS AUTO W/SCOPE: CPT

## 2022-12-20 PROCEDURE — 74177 CT ABD & PELVIS W/CONTRAST: CPT

## 2022-12-20 PROCEDURE — 96374 THER/PROPH/DIAG INJ IV PUSH: CPT

## 2022-12-20 PROCEDURE — 99284 EMERGENCY DEPT VISIT MOD MDM: CPT

## 2022-12-20 PROCEDURE — 85025 COMPLETE CBC W/AUTO DIFF WBC: CPT

## 2022-12-20 PROCEDURE — 36415 COLL VENOUS BLD VENIPUNCTURE: CPT

## 2022-12-20 PROCEDURE — 25010000002 KETOROLAC TROMETHAMINE PER 15 MG: Performed by: EMERGENCY MEDICINE

## 2022-12-20 RX ORDER — OXYCODONE HYDROCHLORIDE AND ACETAMINOPHEN 5; 325 MG/1; MG/1
1 TABLET ORAL EVERY 6 HOURS PRN
Qty: 15 TABLET | Refills: 0 | Status: ON HOLD | OUTPATIENT
Start: 2022-12-20 | End: 2022-12-22 | Stop reason: SDUPTHER

## 2022-12-20 RX ORDER — ONDANSETRON 4 MG/1
4 TABLET, ORALLY DISINTEGRATING ORAL EVERY 8 HOURS PRN
Qty: 10 TABLET | Refills: 0 | Status: ON HOLD | OUTPATIENT
Start: 2022-12-20 | End: 2023-01-12 | Stop reason: SDUPTHER

## 2022-12-20 RX ORDER — SODIUM CHLORIDE 0.9 % (FLUSH) 0.9 %
10 SYRINGE (ML) INJECTION AS NEEDED
Status: DISCONTINUED | OUTPATIENT
Start: 2022-12-20 | End: 2022-12-20 | Stop reason: HOSPADM

## 2022-12-20 RX ORDER — KETOROLAC TROMETHAMINE 15 MG/ML
15 INJECTION, SOLUTION INTRAMUSCULAR; INTRAVENOUS ONCE
Status: COMPLETED | OUTPATIENT
Start: 2022-12-20 | End: 2022-12-20

## 2022-12-20 RX ORDER — TAMSULOSIN HYDROCHLORIDE 0.4 MG/1
1 CAPSULE ORAL DAILY
Qty: 30 CAPSULE | Refills: 0 | Status: SHIPPED | OUTPATIENT
Start: 2022-12-20 | End: 2023-03-09

## 2022-12-20 RX ADMIN — KETOROLAC TROMETHAMINE 15 MG: 15 INJECTION, SOLUTION INTRAMUSCULAR; INTRAVENOUS at 18:59

## 2022-12-20 RX ADMIN — IOPAMIDOL 85 ML: 612 INJECTION, SOLUTION INTRAVENOUS at 17:21

## 2022-12-20 NOTE — ED PROVIDER NOTES
EMERGENCY DEPARTMENT ENCOUNTER    Room Number:  34/34  Date seen:  12/20/2022  PCP: Jenny Garrett MD  Historian: Patient  Relevant information and history provided by sources other than the patient will be included in the context section.  Review of pertinent past medical records may also be included in the context section rather than MDM to avoid unnecessary redundancy.    HPI:  Chief Complaint: Abdominal pain  A complete HPI/ROS/PMH/PSH/SH/FH are unobtainable due to: Nothing  Context: Nandini Morrison is a 36 y.o. female who presents to the ED c/o moderate severity right-sided abdominal pain that started about 2 days ago according to patient.  When it started it was fairly abrupt and severe, was sharp, nonradiating, and was associated with a couple of episodes of vomiting.  She did not do anything for it, but said that over the next 2 days it has been waxing and waning but not as severe as the onset.  Patient said she has not had a meaningful bowel movement in a few days and thought it might be constipation.  She has not had any more nausea or vomiting, but she has had decreased p.o. intake since.  She has no urinary symptoms or fever.  Patient is currently on her menstrual period but says that its not out of the ordinary for her and has not had problems with dysmenorrhea in the past of any significance.    Today, she said the pain is mild to moderate and located mostly in the right lower quadrant without radiation.  Is more of a dull ache at this point, and she has no other associated symptoms with it but it is yet to go away.  She said that it actually gets worse sometimes with certain positions, and when she moves her leg and hip a certain way it seems to exacerbate the pain        REVIEW OF SYSTEMS  Review of Systems   All negative except for above      PAST MEDICAL HISTORY  Active Ambulatory Problems     Diagnosis Date Noted   • Uncontrolled type 1 diabetes mellitus 05/20/2016   • Type 1 diabetes mellitus  ([facility]) 05/20/2016   • Diabetes mellitus type I ([facility]) 05/20/2016   • Vitamin D deficiency disease 10/04/2016   • Abnormal weight gain 10/24/2019   • Hyperlipidemia 04/27/2020     Resolved Ambulatory Problems     Diagnosis Date Noted   • Nontoxic multinodular goiter 02/06/2017     Past Medical History:   Diagnosis Date   • Vitamin D deficiency          PAST SURGICAL HISTORY  No past surgical history on file.      FAMILY HISTORY  Family History   Problem Relation Age of Onset   • Diabetes Maternal Aunt    • Hypertension Maternal Aunt          SOCIAL HISTORY  Social History     Socioeconomic History   • Marital status: Single   Tobacco Use   • Smoking status: Never   • Smokeless tobacco: Never   Substance and Sexual Activity   • Alcohol use: Yes     Comment: socially    • Drug use: Never         ALLERGIES  Amoxicillin          PHYSICAL EXAM  ED Triage Vitals   Temp Heart Rate Resp BP SpO2   12/20/22 1502 12/20/22 1502 12/20/22 1502 12/20/22 1509 12/20/22 1502   98.2 °F (36.8 °C) 113 18 (!) 158/105 98 %      Temp src Heart Rate Source Patient Position BP Location FiO2 (%)   12/20/22 1502 12/20/22 1502 -- -- --   Tympanic Monitor          Physical Exam      GENERAL: no acute distress  HENT: nares patent  EYES: no scleral icterus, PERRL, EOMI  CV: regular rhythm, normal rate, distal pulses symmetric and palpable  RESPIRATORY: normal effort  ABDOMEN: soft, mild right-sided abdominal tenderness without rebound or guarding with normal bowel sound.  No CVA tenderness  MUSCULOSKELETAL: no deformity  NEURO: alert, moves all extremities, follows commands  PSYCH:  calm, cooperative  SKIN: warm, dry with no rash    Vital signs and nursing notes reviewed.          LAB RESULTS  Recent Results (from the past 24 hour(s))   Comprehensive Metabolic Panel    Collection Time: 12/20/22  3:14 PM    Specimen: Blood   Result Value Ref Range    Glucose 200 (H) 65 - 99 mg/dL    BUN 9 6 - 20 mg/dL    Creatinine 0.78 0.57 - 1.00 mg/dL    Sodium 141  136 - 145 mmol/L    Potassium 4.3 3.5 - 5.2 mmol/L    Chloride 104 98 - 107 mmol/L    CO2 26.4 22.0 - 29.0 mmol/L    Calcium 9.2 8.6 - 10.5 mg/dL    Total Protein 7.0 6.0 - 8.5 g/dL    Albumin 4.10 3.50 - 5.20 g/dL    ALT (SGPT) 23 1 - 33 U/L    AST (SGOT) 21 1 - 32 U/L    Alkaline Phosphatase 60 39 - 117 U/L    Total Bilirubin 0.7 0.0 - 1.2 mg/dL    Globulin 2.9 gm/dL    A/G Ratio 1.4 g/dL    BUN/Creatinine Ratio 11.5 7.0 - 25.0    Anion Gap 10.6 5.0 - 15.0 mmol/L    eGFR 101.1 >60.0 mL/min/1.73   Lipase    Collection Time: 12/20/22  3:14 PM    Specimen: Blood   Result Value Ref Range    Lipase 32 13 - 60 U/L   Green Top (Gel)    Collection Time: 12/20/22  3:14 PM   Result Value Ref Range    Extra Tube Hold for add-ons.    Lavender Top    Collection Time: 12/20/22  3:14 PM   Result Value Ref Range    Extra Tube hold for add-on    Gold Top - SST    Collection Time: 12/20/22  3:14 PM   Result Value Ref Range    Extra Tube Hold for add-ons.    Light Blue Top    Collection Time: 12/20/22  3:14 PM   Result Value Ref Range    Extra Tube Hold for add-ons.    CBC Auto Differential    Collection Time: 12/20/22  3:14 PM    Specimen: Blood   Result Value Ref Range    WBC 7.01 3.40 - 10.80 10*3/mm3    RBC 4.01 3.77 - 5.28 10*6/mm3    Hemoglobin 12.9 12.0 - 15.9 g/dL    Hematocrit 39.8 34.0 - 46.6 %    MCV 99.3 (H) 79.0 - 97.0 fL    MCH 32.2 26.6 - 33.0 pg    MCHC 32.4 31.5 - 35.7 g/dL    RDW 12.0 (L) 12.3 - 15.4 %    RDW-SD 44.3 37.0 - 54.0 fl    MPV 9.3 6.0 - 12.0 fL    Platelets 251 140 - 450 10*3/mm3    Neutrophil % 75.2 42.7 - 76.0 %    Lymphocyte % 14.3 (L) 19.6 - 45.3 %    Monocyte % 7.7 5.0 - 12.0 %    Eosinophil % 1.6 0.3 - 6.2 %    Basophil % 0.9 0.0 - 1.5 %    Immature Grans % 0.3 0.0 - 0.5 %    Neutrophils, Absolute 5.28 1.70 - 7.00 10*3/mm3    Lymphocytes, Absolute 1.00 0.70 - 3.10 10*3/mm3    Monocytes, Absolute 0.54 0.10 - 0.90 10*3/mm3    Eosinophils, Absolute 0.11 0.00 - 0.40 10*3/mm3    Basophils, Absolute  0.06 0.00 - 0.20 10*3/mm3    Immature Grans, Absolute 0.02 0.00 - 0.05 10*3/mm3    nRBC 0.0 0.0 - 0.2 /100 WBC   Urinalysis With Microscopic If Indicated (No Culture) - Urine, Clean Catch    Collection Time: 12/20/22  3:30 PM    Specimen: Urine, Clean Catch   Result Value Ref Range    Color, UA Yellow Yellow, Straw    Appearance, UA Cloudy (A) Clear    pH, UA 5.5 5.0 - 8.0    Specific Gravity, UA 1.020 1.005 - 1.030    Glucose, UA Negative Negative    Ketones, UA Trace (A) Negative    Bilirubin, UA Negative Negative    Blood, UA Moderate (2+) (A) Negative    Protein, UA Trace (A) Negative    Leuk Esterase, UA Moderate (2+) (A) Negative    Nitrite, UA Negative Negative    Urobilinogen, UA 0.2 E.U./dL 0.2 - 1.0 E.U./dL   Urinalysis, Microscopic Only - Urine, Clean Catch    Collection Time: 12/20/22  3:30 PM    Specimen: Urine, Clean Catch   Result Value Ref Range    RBC, UA 6-12 (A) None Seen, 0-2 /HPF    WBC, UA 13-20 (A) None Seen, 0-2 /HPF    Bacteria, UA 1+ (A) None Seen /HPF    Squamous Epithelial Cells, UA 3-6 (A) None Seen, 0-2 /HPF    Hyaline Casts, UA 3-6 None Seen /LPF    Calcium Oxalate Crystals, UA Small/1+ None Seen /HPF    Methodology Manual Light Microscopy    hCG, Serum, Qualitative    Collection Time: 12/20/22  3:57 PM    Specimen: Blood   Result Value Ref Range    HCG Qualitative Negative Negative       Ordered the above labs and reviewed the results.        RADIOLOGY  CT Abdomen Pelvis With Contrast    Result Date: 12/20/2022  ABDOMEN AND PELVIS CT WITH CONTRAST  HISTORY: Right lower quadrant abdominal pain.  TECHNIQUE: Abdomen and pelvis CT was performed using 85 mL of Isovue-300 IV contrast. No previous imaging for correlation.  Radiation dose reduction techniques were utilized, including automated exposure control and exposure modulation based on body size.  FINDINGS: Visualized lung bases are clear. Lower mediastinal structures appear normal.  Parenchyma of the liver, pancreas, spleen, and  adrenals appears normal. Renal parenchyma enhances normally but there is right hydronephrosis and asymmetric perinephric stranding. A 5 mm right proximal ureteral calculus is positioned about 3 cm below the right ureteropelvic junction. Three right renal calyceal calculi are observed measuring 3 mm and smaller. There are also 3 calculi at the left renal collecting system, 3 mm and smaller. No left hydronephrosis. The urinary bladder appears normal. There is a tampon as expected. The uterus and ovaries are in situ. Two well demarcated, enhancing exophytic nodules protrude off the uterine fundus anteriorly and posteriorly consistent with fibroids. The more posterior is larger measuring about 33 mm and the anterior lesion is 24 mm.  The bowel, including the appendix, appears normal. No bone or body wall lesion.      Bilateral nephrolithiasis with a proximal right ureteral calculus associated with moderate right hydronephrosis and perinephric stranding. Incidentally noted uterine fibroids. Case discussed with Dr. Kaba at 6:15 PM.  This report was finalized on 12/20/2022 6:19 PM by Dr. Johnny Rock M.D.        Ordered the above noted radiological studies. Reviewed by me in PACS.            PROCEDURES  Procedures              MEDICATIONS GIVEN IN ER  Medications   iopamidol (ISOVUE-300) 61 % injection 85 mL (85 mL Intravenous Given 12/20/22 1721)   ketorolac (TORADOL) injection 15 mg (15 mg Intravenous Given 12/20/22 1859)                   MEDICAL DECISION MAKING, PROGRESS, and CONSULTS    All labs have been independently reviewed by me.    All radiology studies have been independently reviewed by me and discussed with radiologist dictating the report.    EKG's are independently viewed and interpreted by me.      The discussion below represents my analysis of pertinent findings related to patient's current condition, review of past medical history and available medical records, differential diagnosis, and discussion  with consultants regarding this encounter.          ED Course as of 12/20/22 2139 Tue Dec 20, 2022   2138 CBC and chemistry are fairly unremarkable, lipase normal [DP]   2139 hCG is negative and urinalysis shows what appears to be a contaminated clean-catch specimen with some RBCs but she is on her menstrual period.  I do not think that this represents an acute infection [DP]   2139 CT scan of the abdomen pelvis shows no evidence of appendicitis, however there is a 5 mm proximal ureteral stone with hydronephrosis and some mild perinephric stranding which I believe is from a ruptured calyx rather than acute infection [DP]   2139 Patient's pain is under good control at this point, and have given her 1 dose of Toradol.  Had a lengthy conversation with her about outpatient follow-up, straining all of her urine, and prescribe some symptomatic management for her [DP]      ED Course User Index  [DP] Cristian Kaba MD              All appropriate hygiene and PPE requirements were satisfied with this patient encounter    FINAL DIAGNOSES  Final diagnoses:   Right sided abdominal pain   Ureteral calculus   Hydronephrosis with urinary obstruction due to renal calculus         DISPOSITION  Discharge            Latest Documented Vital Signs:  As of 21:39 EST  BP- 147/93 HR- 99 Temp- 98.2 °F (36.8 °C) (Tympanic) O2 sat- 97%        --    Please note that portions of this were completed with a voice recognition program.       Note Disclaimer: At [facility], we believe that sharing information builds trust and better relationships. You are receiving this note because you are receiving care at [facility] or recently visited. It is possible you will see health information before a provider has talked with you about it. This kind of information can be easy to misunderstand. To help you fully understand what it      Cristian Kaba MD  12/20/22 2139

## 2022-12-20 NOTE — ED TRIAGE NOTES
Pt arrives ambulatory to triage for abdominal pain that started yesterday. Pt reports nausea and vomiting with the sharp RLQ pain. Pt also reports constipation with her last bowel movement being Saturday evening. Pt denies any abdominal history.

## 2022-12-20 NOTE — DISCHARGE INSTRUCTIONS
I would recommend taking a stool softener and even laxative every day while you are taking the pain medicine to avoid constipation.  Strain all your urine and until you see the stone in the strainer you should assume it still in there.  Go ahead and make your urology appointment tomorrow even if you feel okay.

## 2022-12-22 ENCOUNTER — HOSPITAL ENCOUNTER (OUTPATIENT)
Facility: HOSPITAL | Age: 37
Setting detail: HOSPITAL OUTPATIENT SURGERY
Discharge: HOME | End: 2022-12-22
Attending: SURGERY | Admitting: SURGERY
Payer: COMMERCIAL

## 2022-12-22 ENCOUNTER — APPOINTMENT (OUTPATIENT)
Dept: GENERAL RADIOLOGY | Facility: HOSPITAL | Age: 37
End: 2022-12-22
Payer: COMMERCIAL

## 2022-12-22 ENCOUNTER — ANESTHESIA EVENT (OUTPATIENT)
Dept: PERIOP | Facility: HOSPITAL | Age: 37
End: 2022-12-22
Payer: COMMERCIAL

## 2022-12-22 ENCOUNTER — ANESTHESIA (OUTPATIENT)
Dept: PERIOP | Facility: HOSPITAL | Age: 37
End: 2022-12-22
Payer: COMMERCIAL

## 2022-12-22 VITALS
RESPIRATION RATE: 18 BRPM | OXYGEN SATURATION: 100 % | SYSTOLIC BLOOD PRESSURE: 124 MMHG | TEMPERATURE: 98.8 F | HEART RATE: 83 BPM | DIASTOLIC BLOOD PRESSURE: 92 MMHG

## 2022-12-22 DIAGNOSIS — N20.1 URETERAL CALCULUS: ICD-10-CM

## 2022-12-22 DIAGNOSIS — N20.1 RIGHT URETERAL STONE: ICD-10-CM

## 2022-12-22 LAB
GLUCOSE BLDC GLUCOMTR-MCNC: 128 MG/DL (ref 70–130)
GLUCOSE BLDC GLUCOMTR-MCNC: 129 MG/DL (ref 70–130)
GLUCOSE BLDC GLUCOMTR-MCNC: 80 MG/DL (ref 70–130)

## 2022-12-22 PROCEDURE — 25010000002 IOPAMIDOL 61 % SOLUTION: Performed by: SURGERY

## 2022-12-22 PROCEDURE — 25010000002 CEFAZOLIN IN DEXTROSE 2-4 GM/100ML-% SOLUTION: Performed by: SURGERY

## 2022-12-22 PROCEDURE — 25010000002 FENTANYL CITRATE (PF) 50 MCG/ML SOLUTION: Performed by: NURSE ANESTHETIST, CERTIFIED REGISTERED

## 2022-12-22 PROCEDURE — C1758 CATHETER, URETERAL: HCPCS | Performed by: SURGERY

## 2022-12-22 PROCEDURE — 74420 UROGRAPHY RTRGR +-KUB: CPT

## 2022-12-22 PROCEDURE — 25010000002 ONDANSETRON PER 1 MG: Performed by: NURSE ANESTHETIST, CERTIFIED REGISTERED

## 2022-12-22 PROCEDURE — 25010000002 KETOROLAC TROMETHAMINE PER 15 MG: Performed by: NURSE ANESTHETIST, CERTIFIED REGISTERED

## 2022-12-22 PROCEDURE — 25010000002 PROPOFOL 10 MG/ML EMULSION: Performed by: NURSE ANESTHETIST, CERTIFIED REGISTERED

## 2022-12-22 PROCEDURE — 25010000002 MIDAZOLAM PER 1 MG: Performed by: ANESTHESIOLOGY

## 2022-12-22 PROCEDURE — 82962 GLUCOSE BLOOD TEST: CPT

## 2022-12-22 PROCEDURE — 87086 URINE CULTURE/COLONY COUNT: CPT | Performed by: SURGERY

## 2022-12-22 PROCEDURE — C1769 GUIDE WIRE: HCPCS | Performed by: SURGERY

## 2022-12-22 PROCEDURE — C2617 STENT, NON-COR, TEM W/O DEL: HCPCS | Performed by: SURGERY

## 2022-12-22 DEVICE — URETERAL STENT
Type: IMPLANTABLE DEVICE | Site: URETER | Status: NON-FUNCTIONAL
Brand: CONTOUR™
Removed: 2023-01-12

## 2022-12-22 RX ORDER — EPHEDRINE SULFATE 50 MG/ML
5 INJECTION, SOLUTION INTRAVENOUS ONCE AS NEEDED
Status: DISCONTINUED | OUTPATIENT
Start: 2022-12-22 | End: 2022-12-22 | Stop reason: HOSPADM

## 2022-12-22 RX ORDER — PROMETHAZINE HYDROCHLORIDE 25 MG/1
25 SUPPOSITORY RECTAL ONCE AS NEEDED
Status: DISCONTINUED | OUTPATIENT
Start: 2022-12-22 | End: 2022-12-22 | Stop reason: HOSPADM

## 2022-12-22 RX ORDER — OXYCODONE HYDROCHLORIDE AND ACETAMINOPHEN 5; 325 MG/1; MG/1
1 TABLET ORAL EVERY 6 HOURS PRN
Qty: 10 TABLET | Refills: 0 | Status: ON HOLD | OUTPATIENT
Start: 2022-12-22 | End: 2023-01-12 | Stop reason: SDUPTHER

## 2022-12-22 RX ORDER — PROPOFOL 10 MG/ML
VIAL (ML) INTRAVENOUS AS NEEDED
Status: DISCONTINUED | OUTPATIENT
Start: 2022-12-22 | End: 2022-12-22 | Stop reason: SURG

## 2022-12-22 RX ORDER — ROCURONIUM BROMIDE 10 MG/ML
INJECTION, SOLUTION INTRAVENOUS AS NEEDED
Status: DISCONTINUED | OUTPATIENT
Start: 2022-12-22 | End: 2022-12-22 | Stop reason: SURG

## 2022-12-22 RX ORDER — CEFAZOLIN SODIUM 2 G/100ML
2 INJECTION, SOLUTION INTRAVENOUS ONCE
Status: COMPLETED | OUTPATIENT
Start: 2022-12-22 | End: 2022-12-22

## 2022-12-22 RX ORDER — FAMOTIDINE 10 MG/ML
20 INJECTION, SOLUTION INTRAVENOUS ONCE
Status: COMPLETED | OUTPATIENT
Start: 2022-12-22 | End: 2022-12-22

## 2022-12-22 RX ORDER — SULFAMETHOXAZOLE AND TRIMETHOPRIM 800; 160 MG/1; MG/1
1 TABLET ORAL 2 TIMES DAILY
Qty: 20 TABLET | Refills: 0 | Status: ON HOLD | OUTPATIENT
Start: 2022-12-22 | End: 2023-01-12

## 2022-12-22 RX ORDER — LIDOCAINE HYDROCHLORIDE 20 MG/ML
INJECTION, SOLUTION INFILTRATION; PERINEURAL AS NEEDED
Status: DISCONTINUED | OUTPATIENT
Start: 2022-12-22 | End: 2022-12-22 | Stop reason: SURG

## 2022-12-22 RX ORDER — IPRATROPIUM BROMIDE AND ALBUTEROL SULFATE 2.5; .5 MG/3ML; MG/3ML
3 SOLUTION RESPIRATORY (INHALATION) ONCE AS NEEDED
Status: DISCONTINUED | OUTPATIENT
Start: 2022-12-22 | End: 2022-12-22 | Stop reason: HOSPADM

## 2022-12-22 RX ORDER — ONDANSETRON 2 MG/ML
INJECTION INTRAMUSCULAR; INTRAVENOUS AS NEEDED
Status: DISCONTINUED | OUTPATIENT
Start: 2022-12-22 | End: 2022-12-22 | Stop reason: SURG

## 2022-12-22 RX ORDER — DIPHENHYDRAMINE HCL 25 MG
25 CAPSULE ORAL
Status: DISCONTINUED | OUTPATIENT
Start: 2022-12-22 | End: 2022-12-22 | Stop reason: HOSPADM

## 2022-12-22 RX ORDER — SODIUM CHLORIDE 0.9 % (FLUSH) 0.9 %
3 SYRINGE (ML) INJECTION EVERY 12 HOURS SCHEDULED
Status: DISCONTINUED | OUTPATIENT
Start: 2022-12-22 | End: 2022-12-22 | Stop reason: HOSPADM

## 2022-12-22 RX ORDER — MAGNESIUM HYDROXIDE 1200 MG/15ML
LIQUID ORAL AS NEEDED
Status: DISCONTINUED | OUTPATIENT
Start: 2022-12-22 | End: 2022-12-22 | Stop reason: HOSPADM

## 2022-12-22 RX ORDER — OXYCODONE AND ACETAMINOPHEN 7.5; 325 MG/1; MG/1
1 TABLET ORAL EVERY 4 HOURS PRN
Status: DISCONTINUED | OUTPATIENT
Start: 2022-12-22 | End: 2022-12-22 | Stop reason: HOSPADM

## 2022-12-22 RX ORDER — OXYBUTYNIN CHLORIDE 5 MG/1
5 TABLET ORAL 3 TIMES DAILY PRN
Qty: 20 TABLET | Refills: 0 | Status: SHIPPED | OUTPATIENT
Start: 2022-12-22 | End: 2023-03-09

## 2022-12-22 RX ORDER — MIDAZOLAM HYDROCHLORIDE 1 MG/ML
1 INJECTION INTRAMUSCULAR; INTRAVENOUS
Status: COMPLETED | OUTPATIENT
Start: 2022-12-22 | End: 2022-12-22

## 2022-12-22 RX ORDER — ONDANSETRON 2 MG/ML
4 INJECTION INTRAMUSCULAR; INTRAVENOUS ONCE AS NEEDED
Status: DISCONTINUED | OUTPATIENT
Start: 2022-12-22 | End: 2022-12-22 | Stop reason: HOSPADM

## 2022-12-22 RX ORDER — HYDROCODONE BITARTRATE AND ACETAMINOPHEN 7.5; 325 MG/1; MG/1
1 TABLET ORAL ONCE AS NEEDED
Status: DISCONTINUED | OUTPATIENT
Start: 2022-12-22 | End: 2022-12-22 | Stop reason: HOSPADM

## 2022-12-22 RX ORDER — NALOXONE HCL 0.4 MG/ML
0.2 VIAL (ML) INJECTION AS NEEDED
Status: DISCONTINUED | OUTPATIENT
Start: 2022-12-22 | End: 2022-12-22 | Stop reason: HOSPADM

## 2022-12-22 RX ORDER — FENTANYL CITRATE 50 UG/ML
50 INJECTION, SOLUTION INTRAMUSCULAR; INTRAVENOUS
Status: DISCONTINUED | OUTPATIENT
Start: 2022-12-22 | End: 2022-12-22 | Stop reason: HOSPADM

## 2022-12-22 RX ORDER — HYDROMORPHONE HYDROCHLORIDE 1 MG/ML
0.5 INJECTION, SOLUTION INTRAMUSCULAR; INTRAVENOUS; SUBCUTANEOUS
Status: DISCONTINUED | OUTPATIENT
Start: 2022-12-22 | End: 2022-12-22 | Stop reason: HOSPADM

## 2022-12-22 RX ORDER — SODIUM CHLORIDE 0.9 % (FLUSH) 0.9 %
3-10 SYRINGE (ML) INJECTION AS NEEDED
Status: DISCONTINUED | OUTPATIENT
Start: 2022-12-22 | End: 2022-12-22 | Stop reason: HOSPADM

## 2022-12-22 RX ORDER — PHENAZOPYRIDINE HYDROCHLORIDE 100 MG/1
100 TABLET, FILM COATED ORAL 3 TIMES DAILY PRN
Qty: 30 TABLET | Refills: 0 | Status: ON HOLD | OUTPATIENT
Start: 2022-12-22 | End: 2023-01-12 | Stop reason: SDUPTHER

## 2022-12-22 RX ORDER — SODIUM CHLORIDE, SODIUM LACTATE, POTASSIUM CHLORIDE, CALCIUM CHLORIDE 600; 310; 30; 20 MG/100ML; MG/100ML; MG/100ML; MG/100ML
9 INJECTION, SOLUTION INTRAVENOUS CONTINUOUS
Status: DISCONTINUED | OUTPATIENT
Start: 2022-12-22 | End: 2022-12-22 | Stop reason: HOSPADM

## 2022-12-22 RX ORDER — SUCCINYLCHOLINE/SOD CL,ISO/PF 200MG/10ML
SYRINGE (ML) INTRAVENOUS AS NEEDED
Status: DISCONTINUED | OUTPATIENT
Start: 2022-12-22 | End: 2022-12-22 | Stop reason: SURG

## 2022-12-22 RX ORDER — PROMETHAZINE HYDROCHLORIDE 25 MG/1
25 TABLET ORAL ONCE AS NEEDED
Status: DISCONTINUED | OUTPATIENT
Start: 2022-12-22 | End: 2022-12-22 | Stop reason: HOSPADM

## 2022-12-22 RX ORDER — KETOROLAC TROMETHAMINE 30 MG/ML
INJECTION, SOLUTION INTRAMUSCULAR; INTRAVENOUS AS NEEDED
Status: DISCONTINUED | OUTPATIENT
Start: 2022-12-22 | End: 2022-12-22 | Stop reason: SURG

## 2022-12-22 RX ORDER — LABETALOL HYDROCHLORIDE 5 MG/ML
5 INJECTION, SOLUTION INTRAVENOUS
Status: DISCONTINUED | OUTPATIENT
Start: 2022-12-22 | End: 2022-12-22 | Stop reason: HOSPADM

## 2022-12-22 RX ORDER — LIDOCAINE HYDROCHLORIDE 10 MG/ML
0.5 INJECTION, SOLUTION EPIDURAL; INFILTRATION; INTRACAUDAL; PERINEURAL ONCE AS NEEDED
Status: DISCONTINUED | OUTPATIENT
Start: 2022-12-22 | End: 2022-12-22 | Stop reason: HOSPADM

## 2022-12-22 RX ORDER — DIPHENHYDRAMINE HYDROCHLORIDE 50 MG/ML
12.5 INJECTION INTRAMUSCULAR; INTRAVENOUS
Status: DISCONTINUED | OUTPATIENT
Start: 2022-12-22 | End: 2022-12-22 | Stop reason: HOSPADM

## 2022-12-22 RX ORDER — DEXTROSE MONOHYDRATE 25 G/50ML
25 INJECTION, SOLUTION INTRAVENOUS ONCE
Status: COMPLETED | OUTPATIENT
Start: 2022-12-22 | End: 2022-12-22

## 2022-12-22 RX ADMIN — FAMOTIDINE 20 MG: 10 INJECTION INTRAVENOUS at 14:28

## 2022-12-22 RX ADMIN — CEFAZOLIN SODIUM 2 G: 2 INJECTION, SOLUTION INTRAVENOUS at 16:32

## 2022-12-22 RX ADMIN — KETOROLAC TROMETHAMINE 30 MG: 30 INJECTION, SOLUTION INTRAMUSCULAR at 17:32

## 2022-12-22 RX ADMIN — PROPOFOL 50 MG: 10 INJECTION, EMULSION INTRAVENOUS at 17:23

## 2022-12-22 RX ADMIN — MIDAZOLAM 1 MG: 1 INJECTION INTRAMUSCULAR; INTRAVENOUS at 14:35

## 2022-12-22 RX ADMIN — ROCURONIUM BROMIDE 10 MG: 10 INJECTION, SOLUTION INTRAVENOUS at 17:21

## 2022-12-22 RX ADMIN — LIDOCAINE HYDROCHLORIDE 60 MG: 20 INJECTION, SOLUTION INFILTRATION; PERINEURAL at 17:21

## 2022-12-22 RX ADMIN — SODIUM CHLORIDE, POTASSIUM CHLORIDE, SODIUM LACTATE AND CALCIUM CHLORIDE 9 ML/HR: 600; 310; 30; 20 INJECTION, SOLUTION INTRAVENOUS at 14:29

## 2022-12-22 RX ADMIN — FENTANYL CITRATE 50 MCG: 50 INJECTION, SOLUTION INTRAMUSCULAR; INTRAVENOUS at 17:21

## 2022-12-22 RX ADMIN — PROPOFOL 150 MG: 10 INJECTION, EMULSION INTRAVENOUS at 17:21

## 2022-12-22 RX ADMIN — MIDAZOLAM 1 MG: 1 INJECTION INTRAMUSCULAR; INTRAVENOUS at 15:44

## 2022-12-22 RX ADMIN — Medication 120 MG: at 17:21

## 2022-12-22 RX ADMIN — DEXTROSE MONOHYDRATE 25 ML: 25 INJECTION, SOLUTION INTRAVENOUS at 15:15

## 2022-12-22 RX ADMIN — ONDANSETRON 4 MG: 2 INJECTION INTRAMUSCULAR; INTRAVENOUS at 17:29

## 2022-12-22 NOTE — ANESTHESIA PROCEDURE NOTES
Airway  Urgency: elective    Date/Time: 12/22/2022 5:22 PM  Airway not difficult    General Information and Staff    Patient location during procedure: OR  CRNA/CAA: Hiwot Ferguson CRNA    Indications and Patient Condition  Indications for airway management: airway protection    Preoxygenated: yes  Mask difficulty assessment: 1 - vent by mask    Final Airway Details  Final airway type: endotracheal airway      Successful airway: ETT  Cuffed: yes   Successful intubation technique: direct laryngoscopy and RSI  Endotracheal tube insertion site: oral  Blade: Crispin  Blade size: 3  ETT size (mm): 7.0  Cormack-Lehane Classification: grade I - full view of glottis  Placement verified by: chest auscultation and capnometry   Measured from: lips  ETT/EBT  to lips (cm): 21  Number of attempts at approach: 1  Assessment: lips, teeth, and gum same as pre-op and atraumatic intubation    Additional Comments   ett cuff up at MOP

## 2022-12-22 NOTE — ANESTHESIA POSTPROCEDURE EVALUATION
Patient: Nandini Morrison    Procedure Summary     Date: 12/22/22 Room / Location: [facility] OR  / [facility] MAIN OR    Anesthesia Start: 1717 Anesthesia Stop: 1750    Procedure: RIGHT CYSTOSCOPY URETERAL STENT INSERTION URETEROSCOPY (Right) Diagnosis:     Surgeons: Igor Ho MD Provider: Martina Freire MD    Anesthesia Type: general ASA Status: 3          Anesthesia Type: general    Vitals  Vitals Value Taken Time   /92 12/22/22 1816   Temp 37.1 °C (98.8 °F) 12/22/22 1755   Pulse 0 12/22/22 1818   Resp 16 12/22/22 1815   SpO2 97 % 12/22/22 1818   Vitals shown include unvalidated device data.        Post Anesthesia Care and Evaluation    Patient location during evaluation: bedside  Patient participation: complete - patient participated  Level of consciousness: awake  Pain management: adequate    Airway patency: patent  Anesthetic complications: No anesthetic complications    Cardiovascular status: acceptable  Respiratory status: acceptable  Hydration status: acceptable    Comments: */92   Pulse 90   Temp 37.1 °C (98.8 °F) (Oral)   Resp 16   LMP 12/16/2022 (Exact Date)   SpO2 100%

## 2022-12-22 NOTE — OP NOTE
Operative Report    [facility] OR    Patient: Nandini Morrison  Age:      36 y.o.  :     1985  Sex:      female    Medical Record:  [id number]    Date of Operation/Procedure:  2022    Pre-op Diagnosis:   Right ureteral stone  Fever    Post-Op Diagnosis Codes:   MEG      Name of Operation/Procedure:    Cystoscopy  Injection and interpretation of right retrograde pyelogram  Placement of right ureteral stent      Findings:   Good placement of 6x26 JJ ureteral stent    Injection and interpretation of Right Retrograde Pyelogram:  Right retrograde pyelogram shot in the standard fashion showing no evidence of filling defects or anatomic abnormalities, mild hydronephrosis.    Complications:    None    Description of procedure:   The patient was taken to the OR and after appropriate placement of lines and monitors, general anesthesia was induced. Pressure points were padded appropriately and SCD's were applied. They were then placed in the dorsal lithotomy position and prepped and draped in the usual sterile fashion.      A 21 Fr cystoscope was introduced into the bladder and pancystoscopy was performed.  No tumors, stones, or foreign bodies were noted. We then located the right ureteral orifice and cannulated it using a 5F ureteral catheter. There was hydronephrotic drip. A culture was sent from the right renal pelvis. A right retrograde pyelogram was performed and interpreted as stated above.  A Sensor guidewire was passed through the 5F ureteral catheter into the kidney without difficulty. The 5F catheter was then removed in a push pull fashion leaving our sensor wire in place. A 6 Fr x 26 cm JJ stent was passed into the kidney into the proper anatomic position. Fluoroscopy was performed showing a good coil in the renal pelvis and a good coil in the bladder. The bladder was then drained and the pt was awoken from anesthesia and transferred to the PACU in stable position.      Estimated Blood Loss:    minimal    Specimens:   Right renal pelvis culture    Fluids/Drains:   6x26 JJ ureteral stent    Disposition:   To PACU then home    Igor Ho MD  12/22/2022  15:14 EST

## 2022-12-22 NOTE — ANESTHESIA PREPROCEDURE EVALUATION
Anesthesia Evaluation     Patient summary reviewed and Nursing notes reviewed                Airway   Mallampati: II  TM distance: >3 FB  Neck ROM: full  Dental      Pulmonary - negative pulmonary ROS   Cardiovascular     ECG reviewed  Rhythm: regular  Rate: normal    (+) hyperlipidemia,       Neuro/Psych- negative ROS  GI/Hepatic/Renal/Endo    (+)   diabetes mellitus type 1 poorly controlled using insulin,     Musculoskeletal (-) negative ROS    Abdominal    Substance History - negative use     OB/GYN negative ob/gyn ROS         Other                        Anesthesia Plan    ASA 3     general     (I have reviewed the patient's history with the patient and the chart, including all pertinent laboratory results and imaging. I have explained the risks of anesthesia including but not limited to dental damage, corneal abrasion, nerve injury, MI, stroke, and death. Questions asked and answered. Anesthetic plan discussed with patient and team as indicated. Patient expressed understanding of the above.  )  intravenous induction     Anesthetic plan, risks, benefits, and alternatives have been provided, discussed and informed consent has been obtained with: patient.        CODE STATUS:

## 2022-12-22 NOTE — H&P
[facility] History & Physical      Patient Identification:  NAME:  Nandini Morrison  Age:  36 y.o.   Sex:  female   :  1985   MRN:  [id number]       Chief complaint: Planned Procedure    History of present illness:  Nandini Morrison is a 36 y.o. female with right ureteral stone. No changes since last seen in clinic.      Past medical history:  Past Medical History:   Diagnosis Date   • Diabetes mellitus type I (HCC)    • Vitamin D deficiency        Past surgical history:  History reviewed. No pertinent surgical history.    Allergies:  Amoxicillin    Home medications:  Medications Prior to Admission   Medication Sig Dispense Refill Last Dose   • albuterol sulfate  (90 Base) MCG/ACT inhaler INHALE 2 PUFFS INTO THE LUNGS EVERY 4-6 HOURS AS NEEDED FOR COUGH. USE WITH VORTEX   2022 at 0000   • montelukast (SINGULAIR) 10 MG tablet Take 10 mg by mouth every night at bedtime.   2022 at 1900   • tamsulosin (FLOMAX) 0.4 MG capsule 24 hr capsule Take 1 capsule by mouth Daily. 30 capsule 0 2022 at 1900   • [DISCONTINUED] oxyCODONE-acetaminophen (PERCOCET) 5-325 MG per tablet Take 1 tablet by mouth Every 6 (Six) Hours As Needed for Severe Pain. 15 tablet 0 2022 at 1600   • Continuous Blood Gluc Sensor (Dexcom G6 Sensor) Dipsense Dexcom G6 Sensors, to replace every 10 days, 3 sensors per 30 day period (NDC #[address]-10). Check 6 times a day. Dx: E 1 9 each 4    • Continuous Blood Gluc Transmit (Dexcom G6 Transmitter) misc 1 each Every 3 (Three) Months. Dispense 1 Dexcom G6 Transmitter for each 3 month period (NDC #[address]-81). Check 6 times a day. Dx: E 1 1 each 4    • glucagon (GLUCAGON EMERGENCY) 1 MG injection Inject 1 mg under the skin into the appropriate area as directed 1 (One) Time As Needed for Low Blood Sugar for up to 1 dose. 1 kit 5    • ID Now COVID-19 kit See Admin Instructions. for testing      • Multiple Vitamins-Minerals (MULTIVITAMIN PO) Take 1 tablet by mouth  daily.   2022   • Needle, Disp, (BD DISP NEEDLES) 25G X 5/8\" misc 4 times a day for insulin injection. 400 each 3    • NovoLOG FlexPen 100 UNIT/ML solution pen-injector sc pen INJECT UP TO 50 UNITS      SUBCUTANEOUSLY DAILY 45 mL 0    • ondansetron ODT (ZOFRAN-ODT) 4 MG disintegrating tablet Place 1 tablet on the tongue Every 8 (Eight) Hours As Needed for Nausea or Vomiting. 10 tablet 0 2022   • Pulmicort Flexhaler 90 MCG/ACT inhaler INHALE 2 PUFFS EVERY MORNING   2022   • Tresiba FlexTouch 200 UNIT/ML solution pen-injector pen injection INJECT 20 UNITS            SUBCUTANEOUSLY INTO THE    APPROPRIATE AREA AS        DIRECTED DAILY 9 mL 1    • vitamin D (ERGOCALCIFEROL) 1.25 MG (18116 UT) capsule capsule TAKE 1 CAPSULE EVERY 7 DAYS 12 capsule 0         Hospital medications:  ceFAZolin, 2 g, Intravenous, Once  dextrose, 25 mL, Intravenous, Once  sodium chloride, 3 mL, Intravenous, Q12H      lactated ringers, 9 mL/hr, Last Rate: 9 mL/hr (22 1429)      •  fentanyl  •  lidocaine PF 1%  •  midazolam  •  sodium chloride    Family history:  Family History   Problem Relation Age of Onset   • Diabetes Maternal Aunt    • Hypertension Maternal Aunt        Social history:  Social History     Tobacco Use   • Smoking status: Never   • Smokeless tobacco: Never   Substance Use Topics   • Alcohol use: Yes     Comment: socially    • Drug use: Never       REVIEW OF SYSTEMS:  Constitutional - Negative for fevers/chills  Eyes/Ears/Nose/Mouth/Throat - Negative for changes in vision  Cardiovascular - Negative for chest pain, dysrhythmia  Respiratory - Negative for dyspnea  Gastrointestinal - Negative for nausea or vomiting  Genitourinary - Negative for dysuria  Hematologic/Lymphatic - Negative for bruising  Skin - Negative for erythema  Endocrine - Negative for polyuria    Objective:  TMax 24 hours:   Temp (24hrs), Av.9 °F (37.2 °C), Min:98.9 °F (37.2 °C), Max:98.9 °F (37.2 °C)      Vitals Ranges:   Temp:   [98.9 °F (37.2 °C)] 98.9 °F (37.2 °C)  Heart Rate:  [89] 89  Resp:  [16] 16  BP: (144)/(91) 144/91    Intake/Output Last 3 shifts:  No intake/output data recorded.     Physical Exam:    General Appearance:    Alert, cooperative, NAD   HEENT:    No trauma, pupils reactive, hearing intact   Back:     No CVA tenderness   Lungs:     Respirations unlabored, no wheezing    Heart:    RRR, intact peripheral pulses   Abdomen:     Soft, NDNT, no masses, no guarding   :    Normal female genitalia   Extremities:   No edema, no deformity   Lymphatic:   No neck or groin LAD   Skin:   No bleeding, bruising or rashes   Neuro/Psych:   Orientation intact, mood/affect pleasant, no focal findings       Results review:   I reviewed the patient's new clinical results.    Data review:  Lab Results (last 24 hours)     Procedure Component Value Units Date/Time    POC Glucose Once [[id number]]  (Normal) Collected: 12/22/22 1346    Specimen: Blood Updated: 12/22/22 1347     Glucose 80 mg/dL      Comment: Meter: [id number] : [id number] Syed HALL              Imaging:  Imaging Results (Last 24 Hours)     ** No results found for the last 24 hours. **             Assessment:       * No active hospital problems. *      Right ureteral stone    Plan:     Proceed with Right ureteral stent placement    The risks, benefits and alternatives of the procedure were discussed in detail with the patient including risk of bleeding, infection, damage to surrounding structures, pain, inability to clear the stone, inability to place stent requiring PCNT, need for more procedures, recurrence of the disease, need for dialysis, thromboembolism, MI, stroke, coma, and even death. The patient verbalized understanding and all questions were answered. They agreed to proceed with the procedure.        Igor Ho MD  [facility]

## 2022-12-23 RX ORDER — FENTANYL CITRATE 50 UG/ML
INJECTION, SOLUTION INTRAMUSCULAR; INTRAVENOUS AS NEEDED
Status: DISCONTINUED | OUTPATIENT
Start: 2022-12-22 | End: 2022-12-23 | Stop reason: SURG

## 2022-12-24 LAB — BACTERIA SPEC AEROBE CULT: NO GROWTH

## 2023-01-12 ENCOUNTER — ANESTHESIA (OUTPATIENT)
Dept: PERIOP | Facility: HOSPITAL | Age: 38
End: 2023-01-12
Payer: COMMERCIAL

## 2023-01-12 ENCOUNTER — ANESTHESIA EVENT (OUTPATIENT)
Dept: PERIOP | Facility: HOSPITAL | Age: 38
End: 2023-01-12
Payer: COMMERCIAL

## 2023-01-12 ENCOUNTER — HOSPITAL ENCOUNTER (OUTPATIENT)
Facility: HOSPITAL | Age: 38
Setting detail: HOSPITAL OUTPATIENT SURGERY
Discharge: HOME | End: 2023-01-12
Attending: SURGERY | Admitting: SURGERY
Payer: COMMERCIAL

## 2023-01-12 ENCOUNTER — APPOINTMENT (OUTPATIENT)
Dept: GENERAL RADIOLOGY | Facility: HOSPITAL | Age: 38
End: 2023-01-12
Payer: COMMERCIAL

## 2023-01-12 VITALS
HEIGHT: 67 IN | BODY MASS INDEX: 32.6 KG/M2 | DIASTOLIC BLOOD PRESSURE: 86 MMHG | RESPIRATION RATE: 17 BRPM | HEART RATE: 72 BPM | WEIGHT: 207.67 LBS | SYSTOLIC BLOOD PRESSURE: 129 MMHG | TEMPERATURE: 98.4 F | OXYGEN SATURATION: 100 %

## 2023-01-12 DIAGNOSIS — N20.1 URETERAL CALCULUS: ICD-10-CM

## 2023-01-12 DIAGNOSIS — N20.1 RIGHT URETERAL STONE: ICD-10-CM

## 2023-01-12 LAB
B-HCG UR QL: NEGATIVE
EXPIRATION DATE: NORMAL
GLUCOSE BLDC GLUCOMTR-MCNC: 134 MG/DL (ref 70–130)
GLUCOSE BLDC GLUCOMTR-MCNC: 149 MG/DL (ref 70–130)
INTERNAL NEGATIVE CONTROL: NEGATIVE
INTERNAL POSITIVE CONTROL: POSITIVE
Lab: NORMAL

## 2023-01-12 PROCEDURE — 82962 GLUCOSE BLOOD TEST: CPT

## 2023-01-12 PROCEDURE — C1758 CATHETER, URETERAL: HCPCS | Performed by: SURGERY

## 2023-01-12 PROCEDURE — 25010000002 FENTANYL CITRATE (PF) 50 MCG/ML SOLUTION: Performed by: STUDENT IN AN ORGANIZED HEALTH CARE EDUCATION/TRAINING PROGRAM

## 2023-01-12 PROCEDURE — C1894 INTRO/SHEATH, NON-LASER: HCPCS | Performed by: SURGERY

## 2023-01-12 PROCEDURE — C2617 STENT, NON-COR, TEM W/O DEL: HCPCS | Performed by: SURGERY

## 2023-01-12 PROCEDURE — 25010000002 ONDANSETRON PER 1 MG: Performed by: STUDENT IN AN ORGANIZED HEALTH CARE EDUCATION/TRAINING PROGRAM

## 2023-01-12 PROCEDURE — 25010000002 SUCCINYLCHOLINE PER 20 MG: Performed by: STUDENT IN AN ORGANIZED HEALTH CARE EDUCATION/TRAINING PROGRAM

## 2023-01-12 PROCEDURE — 82365 CALCULUS SPECTROSCOPY: CPT | Performed by: SURGERY

## 2023-01-12 PROCEDURE — 25010000002 CEFAZOLIN IN DEXTROSE 2-4 GM/100ML-% SOLUTION: Performed by: STUDENT IN AN ORGANIZED HEALTH CARE EDUCATION/TRAINING PROGRAM

## 2023-01-12 PROCEDURE — 25010000002 MIDAZOLAM PER 1 MG: Performed by: ANESTHESIOLOGY

## 2023-01-12 PROCEDURE — 74420 UROGRAPHY RTRGR +-KUB: CPT

## 2023-01-12 PROCEDURE — 0 IOTHALAMATE 60 % SOLUTION: Performed by: SURGERY

## 2023-01-12 PROCEDURE — C1769 GUIDE WIRE: HCPCS | Performed by: SURGERY

## 2023-01-12 PROCEDURE — 81025 URINE PREGNANCY TEST: CPT | Performed by: ANESTHESIOLOGY

## 2023-01-12 PROCEDURE — 25010000002 PROPOFOL 10 MG/ML EMULSION: Performed by: STUDENT IN AN ORGANIZED HEALTH CARE EDUCATION/TRAINING PROGRAM

## 2023-01-12 DEVICE — URETERAL STENT
Type: IMPLANTABLE DEVICE | Site: URETER | Status: FUNCTIONAL
Brand: POLARIS™ ULTRA

## 2023-01-12 RX ORDER — CEFAZOLIN SODIUM 2 G/100ML
INJECTION, SOLUTION INTRAVENOUS AS NEEDED
Status: DISCONTINUED | OUTPATIENT
Start: 2023-01-12 | End: 2023-01-12 | Stop reason: SURG

## 2023-01-12 RX ORDER — SODIUM CHLORIDE 9 MG/ML
40 INJECTION, SOLUTION INTRAVENOUS AS NEEDED
Status: DISCONTINUED | OUTPATIENT
Start: 2023-01-12 | End: 2023-01-12 | Stop reason: HOSPADM

## 2023-01-12 RX ORDER — MAGNESIUM HYDROXIDE 1200 MG/15ML
LIQUID ORAL AS NEEDED
Status: DISCONTINUED | OUTPATIENT
Start: 2023-01-12 | End: 2023-01-12 | Stop reason: HOSPADM

## 2023-01-12 RX ORDER — ROCURONIUM BROMIDE 10 MG/ML
INJECTION, SOLUTION INTRAVENOUS AS NEEDED
Status: DISCONTINUED | OUTPATIENT
Start: 2023-01-12 | End: 2023-01-12 | Stop reason: SURG

## 2023-01-12 RX ORDER — DIPHENHYDRAMINE HYDROCHLORIDE 50 MG/ML
12.5 INJECTION INTRAMUSCULAR; INTRAVENOUS
Status: DISCONTINUED | OUTPATIENT
Start: 2023-01-12 | End: 2023-01-12 | Stop reason: HOSPADM

## 2023-01-12 RX ORDER — HYDRALAZINE HYDROCHLORIDE 20 MG/ML
5 INJECTION INTRAMUSCULAR; INTRAVENOUS
Status: DISCONTINUED | OUTPATIENT
Start: 2023-01-12 | End: 2023-01-12 | Stop reason: HOSPADM

## 2023-01-12 RX ORDER — OXYCODONE AND ACETAMINOPHEN 7.5; 325 MG/1; MG/1
1 TABLET ORAL EVERY 4 HOURS PRN
Status: DISCONTINUED | OUTPATIENT
Start: 2023-01-12 | End: 2023-01-12 | Stop reason: HOSPADM

## 2023-01-12 RX ORDER — OXYCODONE HYDROCHLORIDE AND ACETAMINOPHEN 5; 325 MG/1; MG/1
1 TABLET ORAL EVERY 6 HOURS PRN
Qty: 10 TABLET | Refills: 0 | Status: SHIPPED | OUTPATIENT
Start: 2023-01-12 | End: 2023-03-09

## 2023-01-12 RX ORDER — FAMOTIDINE 10 MG/ML
20 INJECTION, SOLUTION INTRAVENOUS ONCE
Status: COMPLETED | OUTPATIENT
Start: 2023-01-12 | End: 2023-01-12

## 2023-01-12 RX ORDER — ONDANSETRON 2 MG/ML
INJECTION INTRAMUSCULAR; INTRAVENOUS AS NEEDED
Status: DISCONTINUED | OUTPATIENT
Start: 2023-01-12 | End: 2023-01-12 | Stop reason: SURG

## 2023-01-12 RX ORDER — FENTANYL CITRATE 50 UG/ML
50 INJECTION, SOLUTION INTRAMUSCULAR; INTRAVENOUS
Status: DISCONTINUED | OUTPATIENT
Start: 2023-01-12 | End: 2023-01-12 | Stop reason: HOSPADM

## 2023-01-12 RX ORDER — MIDAZOLAM HYDROCHLORIDE 1 MG/ML
1 INJECTION INTRAMUSCULAR; INTRAVENOUS
Status: COMPLETED | OUTPATIENT
Start: 2023-01-12 | End: 2023-01-12

## 2023-01-12 RX ORDER — FENTANYL CITRATE 50 UG/ML
INJECTION, SOLUTION INTRAMUSCULAR; INTRAVENOUS AS NEEDED
Status: DISCONTINUED | OUTPATIENT
Start: 2023-01-12 | End: 2023-01-12 | Stop reason: SURG

## 2023-01-12 RX ORDER — NALOXONE HCL 0.4 MG/ML
0.2 VIAL (ML) INJECTION AS NEEDED
Status: DISCONTINUED | OUTPATIENT
Start: 2023-01-12 | End: 2023-01-12 | Stop reason: HOSPADM

## 2023-01-12 RX ORDER — HYDROMORPHONE HYDROCHLORIDE 1 MG/ML
0.5 INJECTION, SOLUTION INTRAMUSCULAR; INTRAVENOUS; SUBCUTANEOUS
Status: DISCONTINUED | OUTPATIENT
Start: 2023-01-12 | End: 2023-01-12 | Stop reason: HOSPADM

## 2023-01-12 RX ORDER — PROMETHAZINE HYDROCHLORIDE 25 MG/1
25 SUPPOSITORY RECTAL ONCE AS NEEDED
Status: DISCONTINUED | OUTPATIENT
Start: 2023-01-12 | End: 2023-01-12 | Stop reason: HOSPADM

## 2023-01-12 RX ORDER — ONDANSETRON 4 MG/1
4 TABLET, ORALLY DISINTEGRATING ORAL EVERY 8 HOURS PRN
Qty: 10 TABLET | Refills: 0 | Status: SHIPPED | OUTPATIENT
Start: 2023-01-12 | End: 2023-03-09

## 2023-01-12 RX ORDER — HYDROCODONE BITARTRATE AND ACETAMINOPHEN 7.5; 325 MG/1; MG/1
1 TABLET ORAL ONCE AS NEEDED
Status: DISCONTINUED | OUTPATIENT
Start: 2023-01-12 | End: 2023-01-12 | Stop reason: HOSPADM

## 2023-01-12 RX ORDER — SUCCINYLCHOLINE CHLORIDE 20 MG/ML
INJECTION INTRAMUSCULAR; INTRAVENOUS AS NEEDED
Status: DISCONTINUED | OUTPATIENT
Start: 2023-01-12 | End: 2023-01-12 | Stop reason: SURG

## 2023-01-12 RX ORDER — PHENAZOPYRIDINE HYDROCHLORIDE 100 MG/1
100 TABLET, FILM COATED ORAL 3 TIMES DAILY PRN
Qty: 15 TABLET | Refills: 0 | Status: SHIPPED | OUTPATIENT
Start: 2023-01-12 | End: 2023-03-09

## 2023-01-12 RX ORDER — SODIUM CHLORIDE 0.9 % (FLUSH) 0.9 %
10 SYRINGE (ML) INJECTION AS NEEDED
Status: DISCONTINUED | OUTPATIENT
Start: 2023-01-12 | End: 2023-01-12 | Stop reason: HOSPADM

## 2023-01-12 RX ORDER — LIDOCAINE HYDROCHLORIDE 20 MG/ML
INJECTION, SOLUTION INFILTRATION; PERINEURAL AS NEEDED
Status: DISCONTINUED | OUTPATIENT
Start: 2023-01-12 | End: 2023-01-12 | Stop reason: SURG

## 2023-01-12 RX ORDER — SODIUM CHLORIDE, SODIUM LACTATE, POTASSIUM CHLORIDE, CALCIUM CHLORIDE 600; 310; 30; 20 MG/100ML; MG/100ML; MG/100ML; MG/100ML
9 INJECTION, SOLUTION INTRAVENOUS CONTINUOUS
Status: DISCONTINUED | OUTPATIENT
Start: 2023-01-12 | End: 2023-01-12 | Stop reason: HOSPADM

## 2023-01-12 RX ORDER — PROPOFOL 10 MG/ML
VIAL (ML) INTRAVENOUS AS NEEDED
Status: DISCONTINUED | OUTPATIENT
Start: 2023-01-12 | End: 2023-01-12 | Stop reason: SURG

## 2023-01-12 RX ORDER — ONDANSETRON 2 MG/ML
4 INJECTION INTRAMUSCULAR; INTRAVENOUS ONCE AS NEEDED
Status: DISCONTINUED | OUTPATIENT
Start: 2023-01-12 | End: 2023-01-12 | Stop reason: HOSPADM

## 2023-01-12 RX ORDER — SODIUM CHLORIDE 0.9 % (FLUSH) 0.9 %
10 SYRINGE (ML) INJECTION EVERY 12 HOURS SCHEDULED
Status: DISCONTINUED | OUTPATIENT
Start: 2023-01-12 | End: 2023-01-12 | Stop reason: HOSPADM

## 2023-01-12 RX ORDER — FEXOFENADINE HCL 180 MG/1
180 TABLET ORAL DAILY
COMMUNITY

## 2023-01-12 RX ORDER — PROMETHAZINE HYDROCHLORIDE 25 MG/1
25 TABLET ORAL ONCE AS NEEDED
Status: DISCONTINUED | OUTPATIENT
Start: 2023-01-12 | End: 2023-01-12 | Stop reason: HOSPADM

## 2023-01-12 RX ORDER — LABETALOL HYDROCHLORIDE 5 MG/ML
5 INJECTION, SOLUTION INTRAVENOUS
Status: DISCONTINUED | OUTPATIENT
Start: 2023-01-12 | End: 2023-01-12 | Stop reason: HOSPADM

## 2023-01-12 RX ORDER — DIPHENHYDRAMINE HCL 25 MG
25 CAPSULE ORAL
Status: DISCONTINUED | OUTPATIENT
Start: 2023-01-12 | End: 2023-01-12 | Stop reason: HOSPADM

## 2023-01-12 RX ORDER — FLUMAZENIL 0.1 MG/ML
0.2 INJECTION INTRAVENOUS AS NEEDED
Status: DISCONTINUED | OUTPATIENT
Start: 2023-01-12 | End: 2023-01-12 | Stop reason: HOSPADM

## 2023-01-12 RX ORDER — SULFAMETHOXAZOLE AND TRIMETHOPRIM 800; 160 MG/1; MG/1
1 TABLET ORAL 2 TIMES DAILY
Qty: 10 TABLET | Refills: 0 | Status: SHIPPED | OUTPATIENT
Start: 2023-01-12 | End: 2023-03-09

## 2023-01-12 RX ORDER — EPHEDRINE SULFATE 50 MG/ML
5 INJECTION, SOLUTION INTRAVENOUS ONCE AS NEEDED
Status: DISCONTINUED | OUTPATIENT
Start: 2023-01-12 | End: 2023-01-12 | Stop reason: HOSPADM

## 2023-01-12 RX ADMIN — SUCCINYLCHOLINE CHLORIDE 180 MG: 20 INJECTION, SOLUTION INTRAMUSCULAR; INTRAVENOUS; PARENTERAL at 12:03

## 2023-01-12 RX ADMIN — FENTANYL CITRATE 25 MCG: 50 INJECTION, SOLUTION INTRAMUSCULAR; INTRAVENOUS at 12:41

## 2023-01-12 RX ADMIN — MIDAZOLAM 1 MG: 1 INJECTION INTRAMUSCULAR; INTRAVENOUS at 10:50

## 2023-01-12 RX ADMIN — SODIUM CHLORIDE, POTASSIUM CHLORIDE, SODIUM LACTATE AND CALCIUM CHLORIDE 9 ML/HR: 600; 310; 30; 20 INJECTION, SOLUTION INTRAVENOUS at 10:50

## 2023-01-12 RX ADMIN — ONDANSETRON 4 MG: 2 INJECTION INTRAMUSCULAR; INTRAVENOUS at 12:38

## 2023-01-12 RX ADMIN — LIDOCAINE HYDROCHLORIDE 100 MG: 20 INJECTION, SOLUTION INFILTRATION; PERINEURAL at 12:02

## 2023-01-12 RX ADMIN — PROPOFOL 200 MG: 10 INJECTION, EMULSION INTRAVENOUS at 12:02

## 2023-01-12 RX ADMIN — FAMOTIDINE 20 MG: 10 INJECTION INTRAVENOUS at 10:50

## 2023-01-12 RX ADMIN — FENTANYL CITRATE 25 MCG: 50 INJECTION, SOLUTION INTRAMUSCULAR; INTRAVENOUS at 12:11

## 2023-01-12 RX ADMIN — OXYCODONE AND ACETAMINOPHEN 1 TABLET: 7.5; 325 TABLET ORAL at 13:03

## 2023-01-12 RX ADMIN — CEFAZOLIN SODIUM 2 G: 2 INJECTION, SOLUTION INTRAVENOUS at 12:11

## 2023-01-12 RX ADMIN — MIDAZOLAM 1 MG: 1 INJECTION INTRAMUSCULAR; INTRAVENOUS at 11:55

## 2023-01-12 RX ADMIN — ROCURONIUM BROMIDE 5 MG: 50 INJECTION INTRAVENOUS at 12:02

## 2023-01-12 NOTE — ANESTHESIA PROCEDURE NOTES
Airway  Urgency: elective    Date/Time: 1/12/2023 12:04 PM  Airway not difficult    General Information and Staff    Patient location during procedure: OR  Anesthesiologist: Bari Ruiz MD  CRNA/CAA: Narinder Purvis CRNA    Indications and Patient Condition  Indications for airway management: airway protection    Preoxygenated: yes  MILS not maintained throughout  Mask difficulty assessment: 0 - not attempted    Final Airway Details  Final airway type: endotracheal airway      Successful airway: ETT  Cuffed: yes   Successful intubation technique: direct laryngoscopy  Endotracheal tube insertion site: oral  Blade: Crispin  Blade size: 3  ETT size (mm): 7.0  Cormack-Lehane Classification: grade IIa - partial view of glottis  Placement verified by: capnometry   Cuff volume (mL): 8  Measured from: lips  ETT/EBT  to lips (cm): 21  Number of attempts at approach: 1  Assessment: lips, teeth, and gum same as pre-op and atraumatic intubation

## 2023-01-12 NOTE — OP NOTE
Operative Report    [facility]    Patient: Nandini Morrison  Age:      37 y.o.  :     1985  Sex:      female    Medical Record:  [id number]    Date of Operation/Procedure:  2023    Pre-op Diagnosis:   Right ureteral calculus    Post-operative Diagnosis:   Same as above    Name of Operation/Procedure:    1. Cystoscopy  2. Injection and interpretation of Right retrograde pyelogram  3. Right ureteroscopy w/ laser lithotripsy and stone basketing  4.  Right ureteral stent removal and replacement    Findings:   Injection termination of right retrograde pyelogram: Right retrograde pyelogram shot in the standard fashion showing no evidence of hydronephrosis no filling defects    1 stone located in the lower pole lesion small fragments basket out    Second stone located the midpole removed with a basket    Complications:    None    Description of procedure:   The patient was taken to the OR and transferred to the operating table. General anesthesia was then induced and appropriate lines and monitors were placed. They were placed in the dorsal lithotomy position and all pressure points were padded appropriately. DVT prophylaxis was provided via bilateral SCD's. They were then prepped and draped in sterile fashion.  Preoperative antibiotics were given.    A 21 Fr cystoscope was introduced and pan-cystoscopy was performed.  No tumors or stones were seen. The urethra showed no pathology. The previously placed right ureteral stent was grasped using a grasper and pulled out until the distal portion was visible at the meatus.  A sensor wire was then placed through the stent and up into the kidney under direct fluoroscopy.  The stent was moved and a push push pull fashion.  A 5F ureteral catheter was then placed over the wire to the mid ureter.  A gentle retrograde pyelogram was shot showing no evidence of hydronephrosis or filling defects.  A Sensor guidewire was passed through the 5f ureteral catheter and into the  kidney under fluoro guidance without difficulty.  The 5 [place] ureteral catheter was then removed and a push pull fashion.  A second wire was then placed into the kidney under direct fluoroscopy.  Now with 2 wires in place, a ureteral access sheath was placed over a single wire and directed up the ureter under live fluoroscopy.  Once we felt that we were in the correct location the obturator and wire were removed leaving our safety wire in place.    A flexible ureteroscope was directed through the ureteral access sheath up to the level of the stone.  The stone was dusted and small fragments using laser fiber.  Then remove the laser fiber and use a 0 tip basket to grasp all large fragments.  The entire kidney was inspected showed no evidence of residual stone.     We then remove the ureteroscope and ureteral access sheath in unison inspecting the entire ureter.  There is no evidence of residual stone in the mid, proximal, or distal ureter.  We ensured that our safety wire remained in place.    Next, a 6 Fr x 24 cm JJ stent was passed into the kidney into the proper anatomic position.  Once we noted good coil within the patient's renal pelvis we brought in the wire noting good coil in the bladder.  The patients bladder was then drained and they were awoken from anesthesia and transferred to the PACU.    Estimated Blood Loss:   none    Specimens:   Stone for analysis    Fluids/Drains:   6 x 24 double-J ureteral stent    Disposition:   To PACU then home patient to follow-up next week for stent removal    Igor Ho MD  1/12/2023  12:04 EST

## 2023-01-12 NOTE — H&P
[facility] History & Physical      Patient Identification:  NAME:  Nandini Morrison  Age:  37 y.o.   Sex:  female   :  1985   MRN:  [id number]       Chief complaint: Planned Procedure    History of present illness:  Nandini Morrison is a 37 y.o. F w/ right proximal ureteral stone s/p stent placement. No changes since last seen in clinic.      Past medical history:  Past Medical History:   Diagnosis Date   • Diabetes mellitus type I (HCC)    • Ureteral calculus    • Vitamin D deficiency        Past surgical history:  Past Surgical History:   Procedure Laterality Date   • CYSTOSCOPY W/ URETERAL STENT PLACEMENT Right 2022    Procedure: RIGHT CYSTOSCOPY URETERAL STENT INSERTION URETEROSCOPY;  Surgeon: Igor Ho MD;  Location: [facility];  Service: Urology;  Laterality: Right;       Allergies:  Amoxicillin    Home medications:  No medications prior to admission.        Hospital medications:    No current facility-administered medications for this encounter.        Family history:  Family History   Problem Relation Age of Onset   • Diabetes Maternal Aunt    • Hypertension Maternal Aunt    • Malig Hyperthermia Neg Hx        Social history:  Social History     Tobacco Use   • Smoking status: Never   • Smokeless tobacco: Never   Substance Use Topics   • Alcohol use: Yes     Comment: socially    • Drug use: Never       REVIEW OF SYSTEMS:  Constitutional - Negative for fevers/chills  Eyes/Ears/Nose/Mouth/Throat - Negative for changes in vision  Cardiovascular - Negative for chest pain, dysrhythmia  Respiratory - Negative for dyspnea  Gastrointestinal - Negative for nausea or vomiting  Genitourinary - Negative for dysuria  Hematologic/Lymphatic - Negative for bruising  Skin - Negative for erythema  Endocrine - Negative for polyuria    Objective:  TMax 24 hours:   No data recorded.      Vitals Ranges:        Intake/Output Last 3 shifts:  No intake/output data recorded.     Physical  Exam:    General Appearance:    Alert, cooperative, NAD   HEENT:    No trauma, pupils reactive, hearing intact   Back:     No CVA tenderness   Lungs:     Respirations unlabored, no wheezing    Heart:    RRR, intact peripheral pulses   Abdomen:     Soft, NDNT, no masses, no guarding   :    Normal ext female genitalia   Extremities:   No edema, no deformity   Lymphatic:   No neck or groin LAD   Skin:   No bleeding, bruising or rashes   Neuro/Psych:   Orientation intact, mood/affect pleasant, no focal findings       Results review:   I reviewed the patient's new clinical results.    Data review:  Lab Results (last 24 hours)     ** No results found for the last 24 hours. **           Imaging:  Imaging Results (Last 24 Hours)     ** No results found for the last 24 hours. **             Assessment:       * No active hospital problems. *      Right ureterals tone    Plan:     Proceed with right URS/LL/SB stent removal vs replacement    The risks, benefits and alternatives of the procedure were discussed in detail with the patient including risk of bleeding, infection, damage to surrounding structures, pain, inability to clear the stone, inability to place stent requiring PCNT, need for more procedures, recurrence of the disease, need for dialysis, thromboembolism, MI, stroke, coma, and even death. The patient verbalized understanding and all questions were answered. They agreed to proceed with the procedure.      Igor Ho MD  [facility]

## 2023-01-12 NOTE — ANESTHESIA POSTPROCEDURE EVALUATION
"Patient: Nandini Morrison    Procedure Summary     Date: 01/12/23 Room / Location: [facility] OR  / [facility] MAIN OR    Anesthesia Start: 1158 Anesthesia Stop: 1254    Procedure: URETEROSCOPY LASER LITHOTRIPSY STONE BASKET EXTRACTION STENT REPLACEMENT, right retrograde pyelogram (Right) Diagnosis:     Surgeons: Igor Ho MD Provider: Bari Ruiz MD    Anesthesia Type: general ASA Status: 2          Anesthesia Type: general    Vitals  Vitals Value Taken Time   /89 01/12/23 1316   Temp 36.9 °C (98.4 °F) 01/12/23 1257   Pulse 88 01/12/23 1319   Resp 20 01/12/23 1257   SpO2 99 % 01/12/23 1319   Vitals shown include unvalidated device data.        Post Anesthesia Care and Evaluation    Patient location during evaluation: PACU  Patient participation: complete - patient participated  Level of consciousness: awake  Pain management: adequate    Airway patency: patent  Anesthetic complications: No anesthetic complications  PONV Status: controlled  Cardiovascular status: acceptable  Respiratory status: acceptable  Hydration status: acceptable    Comments: /80   Pulse 94   Temp 36.9 °C (98.4 °F) (Oral)   Resp 20   Ht 170.2 cm (67\")   Wt 94.2 kg (207 lb 10.8 oz)   LMP 12/16/2022 (Exact Date)   SpO2 94%   BMI 32.53 kg/m²         "

## 2023-01-12 NOTE — ANESTHESIA PREPROCEDURE EVALUATION
Anesthesia Evaluation     Patient summary reviewed and Nursing notes reviewed   no history of anesthetic complications:  NPO Solid Status: > 6 hours  NPO Liquid Status: > 6 hours           Airway   Mallampati: II  TM distance: >3 FB  Neck ROM: full  no difficulty expected and No difficulty expected  Dental - normal exam     Pulmonary - negative pulmonary ROS and normal exam    breath sounds clear to auscultation  (-) rhonchi, decreased breath sounds, wheezes, rales, stridor  Cardiovascular - normal exam    NYHA Classification: I  Rhythm: regular  Rate: normal    (+) hyperlipidemia,   (-) murmur, weak pulses, friction rub, systolic click, carotid bruits, JVD, peripheral edema      Neuro/Psych- negative ROS  GI/Hepatic/Renal/Endo    (+)   renal disease stones, diabetes mellitus type 1 well controlled,     Musculoskeletal (-) negative ROS    Abdominal  - normal exam    Abdomen: soft.   Substance History - negative use     OB/GYN negative ob/gyn ROS         Other - negative ROS               Anesthesia Evaluation     Patient summary reviewed and Nursing notes reviewed                Airway   Mallampati: II  TM distance: >3 FB  Neck ROM: full  Dental      Pulmonary - negative pulmonary ROS   Cardiovascular     ECG reviewed  Rhythm: regular  Rate: normal    (+) hyperlipidemia,       Neuro/Psych- negative ROS  GI/Hepatic/Renal/Endo    (+)   diabetes mellitus type 1 poorly controlled using insulin,     Musculoskeletal (-) negative ROS    Abdominal    Substance History - negative use     OB/GYN negative ob/gyn ROS         Other                        Anesthesia Plan    ASA 3     general     (I have reviewed the patient's history with the patient and the chart, including all pertinent laboratory results and imaging. I have explained the risks of anesthesia including but not limited to dental damage, corneal abrasion, nerve injury, MI, stroke, and death. Questions asked and answered. Anesthetic plan discussed with patient and  team as indicated. Patient expressed understanding of the above.  )  intravenous induction     Anesthetic plan, risks, benefits, and alternatives have been provided, discussed and informed consent has been obtained with: patient.        CODE STATUS:              Anesthesia Plan    ASA 2     general     intravenous induction     Anesthetic plan, risks, benefits, and alternatives have been provided, discussed and informed consent has been obtained with: patient.        CODE STATUS:

## 2023-01-17 LAB
CALCIUM OXALATE DIHYDRATE MFR STONE IR: 40 %
COLOR STONE: NORMAL
COM MFR STONE: 60 %
COMPN STONE: NORMAL
LABORATORY COMMENT REPORT: NORMAL
LABORATORY COMMENT REPORT: NORMAL
Lab: NORMAL
Lab: NORMAL
PHOTO: NORMAL
SIZE STONE: NORMAL MM
SPEC SOURCE SUBJ: NORMAL
WT STONE: 36 MG

## 2023-02-20 ENCOUNTER — TELEPHONE (OUTPATIENT)
Dept: ENDOCRINOLOGY | Age: 38
End: 2023-02-20
Payer: COMMERCIAL

## 2023-02-20 DIAGNOSIS — E10.65 TYPE 1 DIABETES MELLITUS WITH HYPERGLYCEMIA: Primary | ICD-10-CM

## 2023-02-20 DIAGNOSIS — E55.9 VITAMIN D DEFICIENCY DISEASE: ICD-10-CM

## 2023-02-23 ENCOUNTER — LAB (OUTPATIENT)
Dept: ENDOCRINOLOGY | Age: 38
End: 2023-02-23
Payer: COMMERCIAL

## 2023-02-23 DIAGNOSIS — E55.9 VITAMIN D DEFICIENCY DISEASE: ICD-10-CM

## 2023-02-23 DIAGNOSIS — E10.65 TYPE 1 DIABETES MELLITUS WITH HYPERGLYCEMIA: ICD-10-CM

## 2023-02-24 LAB
25(OH)D3+25(OH)D2 SERPL-MCNC: 58.6 NG/ML (ref 30–100)
ALBUMIN SERPL-MCNC: 4.4 G/DL (ref 3.5–5.2)
ALBUMIN/CREAT UR: <8 MG/G CREAT (ref 0–29)
ALBUMIN/GLOB SERPL: 1.8 G/DL
ALP SERPL-CCNC: 58 U/L (ref 39–117)
ALT SERPL-CCNC: 25 U/L (ref 1–33)
AST SERPL-CCNC: 25 U/L (ref 1–32)
BILIRUB SERPL-MCNC: 0.6 MG/DL (ref 0–1.2)
BUN SERPL-MCNC: 9 MG/DL (ref 6–20)
BUN/CREAT SERPL: 12 (ref 7–25)
CALCIUM SERPL-MCNC: 9.6 MG/DL (ref 8.6–10.5)
CHLORIDE SERPL-SCNC: 102 MMOL/L (ref 98–107)
CHOLEST SERPL-MCNC: 196 MG/DL (ref 0–200)
CO2 SERPL-SCNC: 27.4 MMOL/L (ref 22–29)
CREAT SERPL-MCNC: 0.75 MG/DL (ref 0.57–1)
CREAT UR-MCNC: 39 MG/DL
EGFRCR SERPLBLD CKD-EPI 2021: 105.3 ML/MIN/1.73
GLOBULIN SER CALC-MCNC: 2.4 GM/DL
GLUCOSE SERPL-MCNC: 147 MG/DL (ref 65–99)
HBA1C MFR BLD: 6.4 % (ref 4.8–5.6)
HDLC SERPL-MCNC: 78 MG/DL (ref 40–60)
IMP & REVIEW OF LAB RESULTS: NORMAL
LDLC SERPL CALC-MCNC: 109 MG/DL (ref 0–100)
MICROALBUMIN UR-MCNC: <3 UG/ML
POTASSIUM SERPL-SCNC: 4.6 MMOL/L (ref 3.5–5.2)
PROT SERPL-MCNC: 6.8 G/DL (ref 6–8.5)
SODIUM SERPL-SCNC: 137 MMOL/L (ref 136–145)
T3FREE SERPL-MCNC: 3.6 PG/ML (ref 2–4.4)
T4 FREE SERPL-MCNC: 1.25 NG/DL (ref 0.93–1.7)
TRIGL SERPL-MCNC: 45 MG/DL (ref 0–150)
TSH SERPL DL<=0.005 MIU/L-ACNC: 1.9 UIU/ML (ref 0.27–4.2)
VLDLC SERPL CALC-MCNC: 9 MG/DL (ref 5–40)

## 2023-03-09 ENCOUNTER — OFFICE VISIT (OUTPATIENT)
Dept: ENDOCRINOLOGY | Age: 38
End: 2023-03-09
Payer: COMMERCIAL

## 2023-03-09 VITALS
TEMPERATURE: 97.3 F | WEIGHT: 207.4 LBS | SYSTOLIC BLOOD PRESSURE: 138 MMHG | HEIGHT: 67 IN | OXYGEN SATURATION: 97 % | DIASTOLIC BLOOD PRESSURE: 90 MMHG | BODY MASS INDEX: 32.55 KG/M2 | HEART RATE: 85 BPM

## 2023-03-09 DIAGNOSIS — E66.09 CLASS 1 OBESITY DUE TO EXCESS CALORIES WITH SERIOUS COMORBIDITY AND BODY MASS INDEX (BMI) OF 32.0 TO 32.9 IN ADULT: ICD-10-CM

## 2023-03-09 DIAGNOSIS — E10.65 TYPE 1 DIABETES MELLITUS WITH HYPERGLYCEMIA: Primary | ICD-10-CM

## 2023-03-09 PROCEDURE — 99214 OFFICE O/P EST MOD 30 MIN: CPT | Performed by: NURSE PRACTITIONER

## 2023-03-09 RX ORDER — GLUCAGON 3 MG/1
3 POWDER NASAL AS NEEDED
Qty: 1 EACH | Refills: 1 | Status: SHIPPED | OUTPATIENT
Start: 2023-03-09

## 2023-03-09 RX ORDER — INSULIN ASPART 100 [IU]/ML
INJECTION, SOLUTION INTRAVENOUS; SUBCUTANEOUS
Qty: 45 ML | Refills: 0 | Status: SHIPPED | OUTPATIENT
Start: 2023-03-09

## 2023-03-09 NOTE — PROGRESS NOTES
"Chief Complaint  Diabetes (Type 1: Pt Dexcom is attached, is up to date on eye exam, no hx of retinopathy or neuropathy. Pt states that she is wanting to talk about a new medication. )    Subjective        Nandini Morrison presents to [facility]  History of Present Illness     Type 1 dm    Diagnosed for about   Today in clinic pt reports being on tresiba 18 - 20 units in the am, Novolog 1 unit for 10 gm of carbs, 1 unit for 50 over 100 mg/dl.   Sensor - yes  Average glucose 158  GMI 7.1  61% time in range  5% low  27% high  7% very high    Last eye exam - next week   Denies diabetic complications  Episodes of hypoglycemia - yes, usually if she is more active than usual or missed calculates carbs  Frustrated with inability to lose weight and uncontrollable appetite    Objective   Vital Signs:  /90   Pulse 85   Temp 97.3 °F (36.3 °C) (Temporal)   Ht 170.2 cm (67.01\")   Wt 94.1 kg (207 lb 6.4 oz)   SpO2 97%   BMI 32.48 kg/m²   Estimated body mass index is 32.48 kg/m² as calculated from the following:    Height as of this encounter: 170.2 cm (67.01\").    Weight as of this encounter: 94.1 kg (207 lb 6.4 oz).             Physical Exam  Vitals reviewed.   Constitutional:       General: She is not in acute distress.  HENT:      Head: Normocephalic and atraumatic.   Cardiovascular:      Rate and Rhythm: Normal rate.   Pulmonary:      Effort: Pulmonary effort is normal. No respiratory distress.   Musculoskeletal:         General: No signs of injury. Normal range of motion.      Cervical back: Normal range of motion and neck supple.   Skin:     General: Skin is warm and dry.   Neurological:      Mental Status: She is alert and oriented to person, place, and time. Mental status is at baseline.   Psychiatric:         Mood and Affect: Mood normal.         Behavior: Behavior normal.         Thought Content: Thought content normal.         Judgment: Judgment normal.        Result Review " :  The following data was reviewed by: JOSH Raines on 03/09/2023:  Common labs    Common Labs 9/9/22 9/9/22 9/9/22 9/9/22 12/20/22 12/20/22 2/23/23 2/23/23 2/23/23 2/23/23    1532 1532 1532 1532 1514 1514 1459 1459 1459 1459   Glucose 86     200 (A)   147 (A)    BUN 7     9   9    Creatinine 0.80     0.78   0.75    Sodium 139     141   137    Potassium 4.4     4.3   4.6    Chloride 100     104   102    Calcium 9.8     9.2   9.6    Total Protein         6.8    Albumin      4.10   4.4    Total Bilirubin      0.7   0.6    Alkaline Phosphatase      60   58    AST (SGOT)      21   25    ALT (SGPT)      23   25    WBC     7.01        Hemoglobin     12.9        Hematocrit     39.8        Platelets     251        Total Cholesterol   192    196      Triglycerides   43    45      HDL Cholesterol   74    78 (A)      LDL Cholesterol    110 (A)    109 (A)      Hemoglobin A1C  6.3 (A)        6.40 (A)   Microalbumin, Urine    8.8    <3.0     (A) Abnormal value       Comments are available for some flowsheets but are not being displayed.                        Assessment and Plan   Diagnoses and all orders for this visit:    1. Type 1 diabetes mellitus with hyperglycemia (HCC) (Primary)    2. Class 1 obesity due to excess calories with serious comorbidity and body mass index (BMI) of 32.0 to 32.9 in adult    Other orders  -     Glucagon (Baqsimi Two Pack) 3 MG/DOSE powder; 3 mg into the nostril(s) as directed by provider As Needed (hypoglycemia).  Dispense: 1 each; Refill: 1             Follow Up   No follow-ups on file.     Wants to consider wegovy-educated on off label use, unknown safety profile and potential risk of side effect  A1c favorable no additional insulin changes made today  Discussed preventative therapies of statin and ACE    Patient was given instructions and counseling regarding her condition or for health maintenance advice. Please see specific information pulled into the AVS if appropriate.     Sariah  Cuco, APRN

## 2023-03-09 NOTE — PATIENT INSTRUCTIONS
Brand Names: US  Ozempic (0.25 or 0.5 MG/DOSE);  Ozempic (1 MG/DOSE);  Ozempic (2 MG/DOSE);  Rybelsus;  Wegovy    Brand Names: Emely  Ozempic (0.25 or 0.5 MG/DOSE);  Ozempic (1 MG/DOSE);  Rybelsus    Warning   This drug has been shown to cause thyroid cancer in some animals. It is not known if this happens in humans. If thyroid cancer happens, it may be deadly if not found and treated early. Call your doctor right away if you have a neck mass, trouble breathing, trouble swallowing, or have hoarseness that will not go away.   Do not use this drug if you have a health problem called Multiple Endocrine Neoplasia syndrome type 2 (MEN 2), or if you or a family member have had thyroid cancer.    What is this drug used for?   Ozempic prefilled pens and Rybelsus tablets:   It is used to lower blood sugar in patients with high blood sugar (diabetes).   Ozempic prefilled pens:   It is used to lower the chance of heart attack, stroke, and death in some people.   Wegovy prefilled pens:   It is used to help with weight loss in certain people.    What do I need to tell my doctor BEFORE I take this drug?   For all uses of this drug:   If you are allergic to this drug; any part of this drug; or any other drugs, foods, or substances. Tell your doctor about the allergy and what signs you had.   If you have ever had pancreatitis.   If you have or have ever had depression or thoughts of suicide.   If you are using another drug that has the same drug in it.   If you are using another drug like this one. If you are not sure, ask your doctor or pharmacist.   If using for high blood sugar:   If you have type 1 diabetes. Do not use this drug to treat type 1 diabetes.   This is not a list of all drugs or health problems that interact with this drug.   Tell your doctor and pharmacist about all of your drugs (prescription or OTC, natural products, vitamins) and health problems. You must check to make sure that it is safe for you to take this  drug with all of your drugs and health problems. Do not start, stop, or change the dose of any drug without checking with your doctor.    What are some things I need to know or do while I take this drug?   For all uses of this drug:   Tell all of your health care providers that you take this drug. This includes your doctors, nurses, pharmacists, and dentists.   Follow the diet and workout plan that your doctor told you about.   Have blood work checked as you have been told by the doctor. Talk with the doctor.   Talk with your doctor before you drink alcohol.   Kidney problems have happened. Sometimes, these may need to be treated in the hospital or with dialysis.   If you cannot drink liquids by mouth or if you have upset stomach, throwing up, or diarrhea that does not go away; you need to avoid getting dehydrated. Contact your doctor to find out what to do. Dehydration may lead to low blood pressure or to new or worse kidney problems.   Do not share pen or cartridge devices with another person even if the needle has been changed. Sharing these devices may pass infections from one person to another. This includes infections you may not know you have.   If you are planning on getting pregnant, talk with your doctor. You may need to stop taking this drug at least 2 months before getting pregnant.   If using for high blood sugar:   Wear disease medical alert ID (identification).   Check your blood sugar as you have been told by your doctor.   Do not drive if your blood sugar has been low. There is a greater chance of you having a crash.   It may be harder to control blood sugar during times of stress such as fever, infection, injury, or surgery. A change in physical activity, exercise, or diet may also affect blood sugar.   Tell your doctor if you are pregnant, plan on getting pregnant, or are breast-feeding. You will need to talk about the benefits and risks to you and the baby.   If using for weight loss:   If you  have high blood sugar (diabetes), you will need to watch your blood sugar closely.   Weight loss during pregnancy may cause harm to the unborn baby. If you get pregnant while taking this drug or if you want to get pregnant, call your doctor right away.   Tell your doctor if you are breast-feeding. You will need to talk about any risks to your baby.    What are some side effects that I need to call my doctor about right away?   WARNING/CAUTION: Even though it may be rare, some people may have very bad and sometimes deadly side effects when taking a drug. Tell your doctor or get medical help right away if you have any of the following signs or symptoms that may be related to a very bad side effect:   For all uses of this drug:   Signs of an allergic reaction, like rash; hives; itching; red, swollen, blistered, or peeling skin with or without fever; wheezing; tightness in the chest or throat; trouble breathing, swallowing, or talking; unusual hoarseness; or swelling of the mouth, face, lips, tongue, or throat.   Signs of kidney problems like unable to pass urine, change in how much urine is passed, blood in the urine, or a big weight gain.   Signs of gallbladder problems like pain in the upper right belly area, right shoulder area, or between the shoulder blades; change in stools; dark urine or yellow skin or eyes; or fever with chills.   Severe dizziness or passing out.   A fast heartbeat.   Change in eyesight.   Low blood sugar can happen. The chance may be raised when this drug is used with other drugs for diabetes. Signs may be dizziness, headache, feeling sleepy or weak, shaking, fast heartbeat, confusion, hunger, or sweating. Call your doctor right away if you have any of these signs. Follow what you have been told to do for low blood sugar. This may include taking glucose tablets, liquid glucose, or some fruit juices.   Severe and sometimes deadly pancreas problems (pancreatitis) have happened with this drug.  Call your doctor right away if you have severe stomach pain, severe back pain, or severe upset stomach or throwing up.   If using for weight loss:   New or worse behavior or mood changes like depression or thoughts of suicide.    What are some other side effects of this drug?   All drugs may cause side effects. However, many people have no side effects or only have minor side effects. Call your doctor or get medical help if any of these side effects or any other side effects bother you or do not go away:   If using for high blood sugar:   Constipation, diarrhea, stomach pain, upset stomach, or throwing up.   Tablets:   Decreased appetite.   If using for weight loss:   Constipation, diarrhea, stomach pain, upset stomach, or throwing up.   Headache.   Feeling dizzy, tired, or weak.   Nose or throat irritation.   Bloating.   Burping.   Gas.   These are not all of the side effects that may occur. If you have questions about side effects, call your doctor. Call your doctor for medical advice about side effects.   You may report side effects to your national health agency.    How is this drug best taken?   Use this drug as ordered by your doctor. Read all information given to you. Follow all instructions closely.   Tablets:   Take at least 30 minutes before the first food, drink, or drugs of the day.   Take with plain water only. Do not take with more than 4 ounces (120 mL) of water.   Swallow whole. Do not chew, break, or crush.   Keep taking this drug as you have been told by your doctor or other health care provider, even if you feel well.   Prefilled syringes or pen:   It is given as a shot into the fatty part of the skin on the top of the thigh, belly area, or upper arm.   If you will be giving yourself the shot, your doctor or nurse will teach you how to give the shot.   Take with or without food.   Take the same day each week.   Move the site where you give the shot with each shot.   Do not use if the solution is  cloudy, leaking, or has particles.   Do not use if solution changes color.   Wash your hands before and after use.   Keep taking this drug as you have been told by your doctor or other health care provider, even if you feel well.   Throw away needles in a needle/sharp disposal box. Do not reuse needles or other items. When the box is full, follow all local rules for getting rid of it. Talk with a doctor or pharmacist if you have any questions.   If using for high blood sugar:   Attach new needle before each dose.   Take off the needle after each shot. Do not store this device with the needle on it.   Put the cap back on after you are done using your dose.   If you are also using insulin, you may inject this drug and the insulin in the same area of the body but not right next to each other.   Do not mix this drug in the same syringe with insulin.   This product may make a clicking sound as you prepare the dose. Do not prepare the dose by counting the clicks. Doing so could lead to using the wrong dose.   If using for weight loss:   Each container is for one use only. Use right after opening. Throw away any part of the opened container after the dose is given.   Do not use this drug if it has been dropped or if it is broken.   Do not get this product wet.    What do I do if I miss a dose?   Tablets:   Skip the missed dose and go back to your normal time.   Do not take 2 doses at the same time or extra doses.   Prefilled syringes or pen:   Take a missed dose as soon as you think about it and go back to your normal time.   If it is less than 48 hours until your next dose, skip the missed and go back to your normal time.   Do not take 2 doses within 48 hours of each other.   If you miss 2 doses, call your doctor.    How do I store and/or throw out this drug?   Tablets:   Store in the original container at room temperature.   Store in a dry place. Do not store in a bathroom.   Prefilled syringes or pen:   Store unopened  pens in a refrigerator. Do not freeze.   Do not use if it has been frozen.   Ozempic prefilled pens:   After opening, store in the refrigerator or at room temperature. Throw away any part not used after 56 days.   Protect from heat and light.   Wegovy prefilled pens:   You may store unopened containers at room temperature. If you store at room temperature, throw away any part not used after 28 days.   Store in the outer carton to protect from light.   Protect from heat.   All products:   Keep all drugs in a safe place. Keep all drugs out of the reach of children and pets.   Throw away unused or  drugs. Do not flush down a toilet or pour down a drain unless you are told to do so. Check with your pharmacist if you have questions about the best way to throw out drugs. There may be drug take-back programs in your area.    General drug facts   If your symptoms or health problems do not get better or if they become worse, call your doctor.   Do not share your drugs with others and do not take anyone else's drugs.   Some drugs may have another patient information leaflet. If you have any questions about this drug, please talk with your doctor, nurse, pharmacist, or other health care provider.   If you think there has been an overdose, call your poison control center or get medical care right away. Be ready to tell or show what was taken, how much, and when it happened.    Last Reviewed [id number]  Consumer Information Use and Disclaimer   This generalized information is a limited summary of diagnosis, treatment, and/or medication information. It is not meant to be comprehensive and should be used as a tool to help the user understand and/or assess potential diagnostic and treatment options. It does NOT include all information about conditions, treatments, medications, side effects, or risks that may apply to a specific patient. It is not intended to be medical advice or a substitute for the medical advice,  diagnosis, or treatment of a health care provider based on the health care provider's examination and assessment of a patient's specific and unique circumstances. Patients must speak with a health care provider for complete information about their health, medical questions, and treatment options, including any risks or benefits regarding use of medications. This information does not endorse any treatments or medications as safe, effective, or approved for treating a specific patient. [employer] and its affiliates disclaim any warranty or liability relating to this information or the use thereof. The use of this information is governed by the Terms of Use, available at https://www.[employer].com/en/know/clinical-effectiveness-terms.

## 2023-04-19 RX ORDER — PROCHLORPERAZINE 25 MG/1
SUPPOSITORY RECTAL
Qty: 9 EACH | Refills: 3 | Status: SHIPPED | OUTPATIENT
Start: 2023-04-19

## 2023-05-22 DIAGNOSIS — E10.65 TYPE 1 DIABETES MELLITUS WITH HYPERGLYCEMIA: Primary | ICD-10-CM

## 2023-05-23 RX ORDER — INSULIN DEGLUDEC 200 U/ML
20 INJECTION, SOLUTION SUBCUTANEOUS DAILY
Qty: 9 ML | Refills: 0 | Status: SHIPPED | OUTPATIENT
Start: 2023-05-23 | End: 2023-08-21

## 2023-05-23 RX ORDER — INSULIN ASPART 100 [IU]/ML
50 INJECTION, SOLUTION INTRAVENOUS; SUBCUTANEOUS DAILY
Qty: 45 ML | Refills: 0 | Status: SHIPPED | OUTPATIENT
Start: 2023-05-23 | End: 2023-08-21

## 2023-06-19 DIAGNOSIS — E10.65 TYPE 1 DIABETES MELLITUS WITH HYPERGLYCEMIA: Primary | ICD-10-CM

## 2023-06-19 RX ORDER — PROCHLORPERAZINE 25 MG/1
1 SUPPOSITORY RECTAL
Qty: 1 EACH | Refills: 4 | Status: SHIPPED | OUTPATIENT
Start: 2023-06-19

## 2023-08-23 ENCOUNTER — TELEPHONE (OUTPATIENT)
Dept: ENDOCRINOLOGY | Age: 38
End: 2023-08-23
Payer: COMMERCIAL

## 2023-08-23 DIAGNOSIS — E10.65 TYPE 1 DIABETES MELLITUS WITH HYPERGLYCEMIA: Primary | ICD-10-CM

## 2023-08-29 DIAGNOSIS — E10.65 TYPE 1 DIABETES MELLITUS WITH HYPERGLYCEMIA: ICD-10-CM

## 2023-08-30 LAB
ALBUMIN SERPL-MCNC: 4.4 G/DL (ref 3.5–5.2)
ALBUMIN/GLOB SERPL: 1.6 G/DL
ALP SERPL-CCNC: 56 U/L (ref 39–117)
ALT SERPL-CCNC: 25 U/L (ref 1–33)
AST SERPL-CCNC: 29 U/L (ref 1–32)
BILIRUB SERPL-MCNC: 0.8 MG/DL (ref 0–1.2)
BUN SERPL-MCNC: 11 MG/DL (ref 6–20)
BUN/CREAT SERPL: 14.7 (ref 7–25)
CALCIUM SERPL-MCNC: 10.5 MG/DL (ref 8.6–10.5)
CHLORIDE SERPL-SCNC: 100 MMOL/L (ref 98–107)
CHOLEST SERPL-MCNC: 215 MG/DL (ref 0–200)
CO2 SERPL-SCNC: 26.7 MMOL/L (ref 22–29)
CREAT SERPL-MCNC: 0.75 MG/DL (ref 0.57–1)
EGFRCR SERPLBLD CKD-EPI 2021: 105.3 ML/MIN/1.73
GLOBULIN SER CALC-MCNC: 2.7 GM/DL
GLUCOSE SERPL-MCNC: 214 MG/DL (ref 65–99)
HBA1C MFR BLD: 7 % (ref 4.8–5.6)
HDLC SERPL-MCNC: 86 MG/DL (ref 40–60)
IMP & REVIEW OF LAB RESULTS: NORMAL
LDLC SERPL CALC-MCNC: 122 MG/DL (ref 0–100)
POTASSIUM SERPL-SCNC: 5.9 MMOL/L (ref 3.5–5.2)
PROT SERPL-MCNC: 7.1 G/DL (ref 6–8.5)
SODIUM SERPL-SCNC: 138 MMOL/L (ref 136–145)
TRIGL SERPL-MCNC: 36 MG/DL (ref 0–150)
TSH SERPL DL<=0.005 MIU/L-ACNC: 2.39 UIU/ML (ref 0.27–4.2)
VLDLC SERPL CALC-MCNC: 7 MG/DL (ref 5–40)

## 2023-09-06 DIAGNOSIS — E10.65 TYPE 1 DIABETES MELLITUS WITH HYPERGLYCEMIA: ICD-10-CM

## 2023-09-07 RX ORDER — INSULIN DEGLUDEC 200 U/ML
INJECTION, SOLUTION SUBCUTANEOUS
Qty: 6 ML | Refills: 0 | Status: SHIPPED | OUTPATIENT
Start: 2023-09-07

## 2023-09-12 ENCOUNTER — OFFICE VISIT (OUTPATIENT)
Dept: ENDOCRINOLOGY | Age: 38
End: 2023-09-12
Payer: COMMERCIAL

## 2023-09-12 VITALS
WEIGHT: 197 LBS | DIASTOLIC BLOOD PRESSURE: 90 MMHG | SYSTOLIC BLOOD PRESSURE: 140 MMHG | BODY MASS INDEX: 30.92 KG/M2 | HEART RATE: 120 BPM | TEMPERATURE: 96.8 F | OXYGEN SATURATION: 93 % | HEIGHT: 67 IN

## 2023-09-12 DIAGNOSIS — E10.65 TYPE 1 DIABETES MELLITUS WITH HYPERGLYCEMIA: Primary | ICD-10-CM

## 2023-09-12 PROCEDURE — 95251 CONT GLUC MNTR ANALYSIS I&R: CPT | Performed by: NURSE PRACTITIONER

## 2023-09-12 PROCEDURE — 99213 OFFICE O/P EST LOW 20 MIN: CPT | Performed by: NURSE PRACTITIONER

## 2023-09-12 RX ORDER — LISDEXAMFETAMINE DIMESYLATE 60 MG/1
1 TABLET, CHEWABLE ORAL EVERY MORNING
COMMUNITY
Start: 2023-08-11

## 2023-09-12 RX ORDER — DEXTROAMPHETAMINE SULFATE 10 MG/1
TABLET ORAL
COMMUNITY
Start: 2023-07-26

## 2023-09-12 RX ORDER — INSULIN DEGLUDEC 200 U/ML
22 INJECTION, SOLUTION SUBCUTANEOUS DAILY
Qty: 15 ML | Refills: 1 | Status: SHIPPED | OUTPATIENT
Start: 2023-09-12 | End: 2023-12-11

## 2023-09-12 RX ORDER — ACYCLOVIR 400 MG/1
1 TABLET ORAL
Qty: 9 EACH | Refills: 1 | Status: SHIPPED | OUTPATIENT
Start: 2023-09-12

## 2023-09-12 NOTE — PROGRESS NOTES
"Chief Complaint  Diabetes (Dexcom attached)    Subjective        Nandini Morrison presents to [facility] ENDOCRINOLOGY  History of Present Illness    More stressed recently and eating differently     Type 1 dm    Diagnosed for about   Today in clinic pt reports being on tresiba 18 - 22 units in the am, Novolog 1 unit for 10 gm of carbs, 1 unit for 50 over 100 mg/dl.     Sensor - dexcom  Average glucose 158  GMI 7.4  51% time in range  4% low  2% very low  28% high  15% very high     Last eye exam - 3/2023  No known diabetic complications  Episodes of hypoglycemia - rare  Noted on cgm from warm up periods that aren't accurate  Has glucagon for emergency       Objective   Vital Signs:  /90   Pulse 120   Temp 96.8 °F (36 °C) (Temporal)   Ht 170.2 cm (67.01\")   Wt 89.4 kg (197 lb)   SpO2 93%   BMI 30.85 kg/m²   Estimated body mass index is 30.85 kg/m² as calculated from the following:    Height as of this encounter: 170.2 cm (67.01\").    Weight as of this encounter: 89.4 kg (197 lb).             Physical Exam  Vitals reviewed.   Constitutional:       General: She is not in acute distress.  HENT:      Head: Normocephalic and atraumatic.   Cardiovascular:      Rate and Rhythm: Normal rate.   Pulmonary:      Effort: Pulmonary effort is normal. No respiratory distress.   Musculoskeletal:         General: No signs of injury. Normal range of motion.      Cervical back: Normal range of motion and neck supple.   Skin:     General: Skin is warm and dry.   Neurological:      Mental Status: She is alert and oriented to person, place, and time. Mental status is at baseline.   Psychiatric:         Mood and Affect: Mood normal.         Behavior: Behavior normal.         Thought Content: Thought content normal.         Judgment: Judgment normal.      Result Review :  The following data was reviewed by: JOSH Raines on 09/12/2023:  Common labs          12/20/2022    15:14 2/23/2023    14:59 " 8/29/2023    15:02   Common Labs   Glucose 200  147  214    BUN 9  9  11    Creatinine 0.78  0.75  0.75    Sodium 141  137  138    Potassium 4.3  4.6  5.9    Chloride 104  102  100    Calcium 9.2  9.6  10.5    Total Protein  6.8  7.1    Albumin 4.10  4.4  4.4    Total Bilirubin 0.7  0.6  0.8    Alkaline Phosphatase 60  58  56    AST (SGOT) 21  25  29    ALT (SGPT) 23  25  25    WBC 7.01      Hemoglobin 12.9      Hematocrit 39.8      Platelets 251      Total Cholesterol  196  215    Triglycerides  45  36    HDL Cholesterol  78  86    LDL Cholesterol   109  122    Hemoglobin A1C  6.40  7.00    Microalbumin, Urine  <3.0                    Assessment and Plan   Diagnoses and all orders for this visit:    1. Type 1 diabetes mellitus with hyperglycemia [E10.65 (ICD-10-CM)] (Primary)  -     Tresiba FlexTouch 200 UNIT/ML solution pen-injector pen injection; Inject 22 Units under the skin into the appropriate area as directed Daily for 90 days.  Dispense: 15 mL; Refill: 1  -     Continuous Blood Gluc Sensor (Dexcom G7 Sensor) misc; Use 1 each Every 10 (Ten) Days.  Dispense: 9 each; Refill: 1             Follow Up   Return in about 6 months (around 3/12/2024).    A1c still at goal  Wants to hold on ace/arb/station  Noted HTN- will continue to check BP at home with goal < 130/80    Patient was given instructions and counseling regarding her condition or for health maintenance advice. Please see specific information pulled into the AVS if appropriate.     Sariah Norwood APRN

## 2023-12-06 DIAGNOSIS — E10.65 TYPE 1 DIABETES MELLITUS WITH HYPERGLYCEMIA: ICD-10-CM

## 2023-12-07 RX ORDER — INSULIN DEGLUDEC 200 U/ML
INJECTION, SOLUTION SUBCUTANEOUS
Qty: 9 ML | Refills: 0 | Status: SHIPPED | OUTPATIENT
Start: 2023-12-07

## 2023-12-07 NOTE — TELEPHONE ENCOUNTER
Rx Refill Note  Requested Prescriptions     Pending Prescriptions Disp Refills    Tresiba FlexTouch 200 UNIT/ML solution pen-injector pen injection [Pharmacy Med Name: TRESIBA FLEX PEN 200U/ML] 9 mL 0     Sig: INJECT 20 UNITS SUBCUTANEOUSLY INTO THE APPROPRIATE AREA AS DIRECTED DAILY (INSULIN DEPENDENT DIABETES)      Last office visit with prescribing clinician: 9/12/2023   Last telemedicine visit with prescribing clinician: Visit date not found   Next office visit with prescribing clinician: 3/12/2024                         Would you like a call back once the refill request has been completed: [] Yes [] No    If the office needs to give you a call back, can they leave a voicemail: [] Yes [] No    Madai Roberts  12/07/23, 08:21 EST

## 2024-03-12 ENCOUNTER — OFFICE VISIT (OUTPATIENT)
Dept: ENDOCRINOLOGY | Age: 39
End: 2024-03-12
Payer: COMMERCIAL

## 2024-03-12 VITALS
DIASTOLIC BLOOD PRESSURE: 82 MMHG | HEART RATE: 96 BPM | TEMPERATURE: 97.8 F | OXYGEN SATURATION: 100 % | WEIGHT: 200.6 LBS | HEIGHT: 67 IN | BODY MASS INDEX: 31.48 KG/M2 | SYSTOLIC BLOOD PRESSURE: 136 MMHG

## 2024-03-12 DIAGNOSIS — E10.65 TYPE 1 DIABETES MELLITUS WITH HYPERGLYCEMIA: Primary | ICD-10-CM

## 2024-03-12 PROCEDURE — 99213 OFFICE O/P EST LOW 20 MIN: CPT | Performed by: NURSE PRACTITIONER

## 2024-03-12 PROCEDURE — 95251 CONT GLUC MNTR ANALYSIS I&R: CPT | Performed by: NURSE PRACTITIONER

## 2024-03-12 RX ORDER — PROCHLORPERAZINE 25 MG/1
SUPPOSITORY RECTAL
Qty: 9 EACH | Refills: 1 | Status: SHIPPED | OUTPATIENT
Start: 2024-03-12

## 2024-03-12 RX ORDER — INSULIN DEGLUDEC 200 U/ML
22 INJECTION, SOLUTION SUBCUTANEOUS DAILY
Qty: 10 ML | Refills: 1 | Status: SHIPPED | OUTPATIENT
Start: 2024-03-12

## 2024-03-12 RX ORDER — INSULIN ASPART 100 [IU]/ML
50 INJECTION, SOLUTION INTRAVENOUS; SUBCUTANEOUS DAILY
Qty: 45 ML | Refills: 1 | Status: SHIPPED | OUTPATIENT
Start: 2024-03-12 | End: 2024-06-10

## 2024-03-12 RX ORDER — PEN NEEDLE, DIABETIC 29 G X1/2"
NEEDLE, DISPOSABLE MISCELLANEOUS
Qty: 500 EACH | Refills: 4 | Status: SHIPPED | OUTPATIENT
Start: 2024-03-12

## 2024-03-12 RX ORDER — PROCHLORPERAZINE 25 MG/1
1 SUPPOSITORY RECTAL
Qty: 1 EACH | Refills: 1 | Status: SHIPPED | OUTPATIENT
Start: 2024-03-12

## 2024-03-12 NOTE — PROGRESS NOTES
"Chief Complaint  Diabetes    Subjective        Nandini Morrison presents to [facility] ENDOCRINOLOGY  History of Present Illness    Type 1 dm, age 16  DM regimen: tresiba 20 units in the am, Novolog 1 unit for 10 gm of carbs, 1 unit for 50 over 100 mg/dl.      Sensor - dexcom, 93% active data, 2/28/12-3/12/24  Average glucose 140  GMI 6.7&  66% time in range  5% low  4% very low- miscalculation of carbs, exercise time changes   20% high  5% very high     Last eye exam - 3/2024  Has glucagon for emergency hypoglycemia  Denies diabetic foot concerns      Objective   Vital Signs:  /82   Pulse 96   Temp 97.8 °F (36.6 °C) (Temporal)   Ht 170.2 cm (67.01\")   Wt 91 kg (200 lb 9.6 oz)   SpO2 100%   BMI 31.41 kg/m²   Estimated body mass index is 31.41 kg/m² as calculated from the following:    Height as of this encounter: 170.2 cm (67.01\").    Weight as of this encounter: 91 kg (200 lb 9.6 oz).             Physical Exam  Vitals reviewed.   Constitutional:       General: She is not in acute distress.  HENT:      Head: Normocephalic and atraumatic.   Cardiovascular:      Rate and Rhythm: Normal rate.   Pulmonary:      Effort: Pulmonary effort is normal. No respiratory distress.   Musculoskeletal:         General: No signs of injury. Normal range of motion.      Cervical back: Normal range of motion and neck supple.   Skin:     General: Skin is warm and dry.   Neurological:      Mental Status: She is alert and oriented to person, place, and time. Mental status is at baseline.   Psychiatric:         Mood and Affect: Mood normal.         Behavior: Behavior normal.         Thought Content: Thought content normal.         Judgment: Judgment normal.          Result Review :    The following data was reviewed by: JOSH Raines on 03/12/2024:  Common labs          8/29/2023    15:02   Common Labs   Glucose 214    BUN 11    Creatinine 0.75    Sodium 138    Potassium 5.9    Chloride 100    Calcium " 10.5    Total Protein 7.1    Albumin 4.4    Total Bilirubin 0.8    Alkaline Phosphatase 56    AST (SGOT) 29    ALT (SGPT) 25    Total Cholesterol 215    Triglycerides 36    HDL Cholesterol 86    LDL Cholesterol  122    Hemoglobin A1C 7.00                   Assessment and Plan     Diagnoses and all orders for this visit:    1. Type 1 diabetes mellitus with hyperglycemia (Primary)  -     Continuous Blood Gluc Sensor (Dexcom G6 Sensor); REPLACE EVERY 10 DAYS, 3 SENSORS PER 30DAYS PERIOD. CHECK 6 TIMES A DAY.  Dispense: 9 each; Refill: 1  -     Continuous Blood Gluc Transmit (Dexcom G6 Transmitter) misc; Use 1 each Every 3 (Three) Months. Dispense 1 Dexcom G6 Transmitter for each 3 month period (NDC #[address]-34). Check 6 times a day. Dx: E 10.65  Indications: Insulin-Dependent Diabetes  Dispense: 1 each; Refill: 1  -     Tresiba FlexTouch 200 UNIT/ML solution pen-injector pen injection; Inject 22 Units under the skin into the appropriate area as directed Daily.  Dispense: 10 mL; Refill: 1  -     insulin aspart (NovoLOG FlexPen) 100 UNIT/ML solution pen-injector sc pen; Inject 50 Units under the skin into the appropriate area as directed Daily for 90 days. INJECT UP TO 50 UNITS SUBCUTANEOUSLY DAILY. 1u per 10 carbs and 1u for every 50/100  Indications: Insulin-Dependent Diabetes  Dispense: 45 mL; Refill: 1  -     Insulin Pen Needle (RELION PEN NEEDLE 31G/8MM) 31G X 8 MM misc; For use with pen device up to 6 times a day. Can substitute based on availability and insurance. E10.65  Dispense: 500 each; Refill: 4  -     TSH; Future  -     Hemoglobin A1c; Future  -     Comprehensive Metabolic Panel; Future  -     Lipid Panel; Future  -     Microalbumin / Creatinine Urine Ratio - Urine, Clean Catch; Future             Follow Up     Return in about 6 months (around 9/12/2024).    Labs this week or next  Change back to dexcom g6 per patient request  Cgm reviewed, hypoglycemia plan in place, continue cgm  Still wants to hold on  guideline recommendations- ace/arb/statin    Patient was given instructions and counseling regarding her condition or for health maintenance advice. Please see specific information pulled into the AVS if appropriate.       Sariah Norwood APRN

## 2024-03-20 DIAGNOSIS — E10.65 TYPE 1 DIABETES MELLITUS WITH HYPERGLYCEMIA: ICD-10-CM

## 2024-03-21 RX ORDER — PROCHLORPERAZINE 25 MG/1
SUPPOSITORY RECTAL
Qty: 9 EACH | Refills: 0 | Status: SHIPPED | OUTPATIENT
Start: 2024-03-21

## 2024-03-21 RX ORDER — PROCHLORPERAZINE 25 MG/1
1 SUPPOSITORY RECTAL
Qty: 1 EACH | Refills: 0 | Status: SHIPPED | OUTPATIENT
Start: 2024-03-21

## 2024-03-21 RX ORDER — INSULIN ASPART 100 [IU]/ML
50 INJECTION, SOLUTION INTRAVENOUS; SUBCUTANEOUS DAILY
Qty: 45 ML | Refills: 0 | Status: SHIPPED | OUTPATIENT
Start: 2024-03-21 | End: 2024-09-17

## 2024-03-21 NOTE — TELEPHONE ENCOUNTER
Rx Refill Note  Requested Prescriptions     Pending Prescriptions Disp Refills    Continuous Blood Gluc Sensor (Dexcom G6 Sensor) 9 each 1     Sig: REPLACE EVERY 10 DAYS, 3 SENSORS PER 30DAYS PERIOD. CHECK 6 TIMES A DAY.    insulin aspart (NovoLOG FlexPen) 100 UNIT/ML solution pen-injector sc pen 45 mL 1     Sig: Inject 50 Units under the skin into the appropriate area as directed Daily for 180 days. INJECT UP TO 50 UNITS SUBCUTANEOUSLY DAILY. 1u per 10 carbs and 1u for every 50/100  Indications: Insulin-Dependent Diabetes      Last office visit with prescribing clinician: 3/12/2024   Last telemedicine visit with prescribing clinician: Visit date not found   Next office visit with prescribing clinician: Visit date not found                         Would you like a call back once the refill request has been completed: [] Yes [] No    If the office needs to give you a call back, can they leave a voicemail: [] Yes [] No    Madai Roberts  03/21/24, 07:48 EDT

## 2024-06-12 DIAGNOSIS — E10.65 TYPE 1 DIABETES MELLITUS WITH HYPERGLYCEMIA: ICD-10-CM

## 2024-06-12 NOTE — TELEPHONE ENCOUNTER
Rx Refill Note  Requested Prescriptions     Pending Prescriptions Disp Refills    Tresiba FlexTouch 200 UNIT/ML solution pen-injector pen injection 10 mL 1     Sig: Inject 22 Units under the skin into the appropriate area as directed Daily.      Last office visit with prescribing clinician: 3/12/2024   Last telemedicine visit with prescribing clinician: Visit date not found   Next office visit with prescribing clinician: 9/18/2024                         Would you like a call back once the refill request has been completed: [] Yes [] No    If the office needs to give you a call back, can they leave a voicemail: [] Yes [] No    Josette Meadows MA  06/12/24, 17:32 EDT

## 2024-06-12 NOTE — TELEPHONE ENCOUNTER
Rx Refill Note  Requested Prescriptions     Pending Prescriptions Disp Refills    Continuous Glucose Sensor (Dexcom G6 Sensor) [Pharmacy Med Name: DEXCOM G6 SENSOR] 9 each 1     Sig: Apply  topically Every 10 (Ten) Days. USE AS DIRECTED TO MONITOR BLOOD SUGAR. REPLACE EVERY 10 DAYS.  Indications: Insulin-Dependent Diabetes      Last office visit with prescribing clinician: 3/12/2024   Last telemedicine visit with prescribing clinician: Visit date not found   Next office visit with prescribing clinician: 9/18/24                      Would you like a call back once the refill request has been completed: [] Yes [] No    If the office needs to give you a call back, can they leave a voicemail: [] Yes [] No    Josette Meadows MA  06/12/24, 17:29 EDT

## 2024-06-13 RX ORDER — INSULIN DEGLUDEC 200 U/ML
20 INJECTION, SOLUTION SUBCUTANEOUS DAILY
Qty: 10 ML | Refills: 1 | Status: SHIPPED | OUTPATIENT
Start: 2024-06-13

## 2024-06-13 RX ORDER — PROCHLORPERAZINE 25 MG/1
SUPPOSITORY RECTAL
Qty: 9 EACH | Refills: 1 | Status: SHIPPED | OUTPATIENT
Start: 2024-06-13

## 2024-08-27 ENCOUNTER — HOSPITAL ENCOUNTER (OUTPATIENT)
Facility: HOSPITAL | Age: 39
Setting detail: OBSERVATION
Discharge: HOME | End: 2024-08-28
Attending: EMERGENCY MEDICINE | Admitting: EMERGENCY MEDICINE
Payer: COMMERCIAL

## 2024-08-27 ENCOUNTER — APPOINTMENT (OUTPATIENT)
Dept: CT IMAGING | Facility: HOSPITAL | Age: 39
End: 2024-08-27
Payer: COMMERCIAL

## 2024-08-27 DIAGNOSIS — N20.0 KIDNEY STONE: ICD-10-CM

## 2024-08-27 DIAGNOSIS — E10.9 TYPE 1 DIABETES MELLITUS WITHOUT COMPLICATION: Primary | ICD-10-CM

## 2024-08-27 DIAGNOSIS — N20.1 URETEROLITHIASIS: ICD-10-CM

## 2024-08-27 LAB
ALBUMIN SERPL-MCNC: 4.5 G/DL (ref 3.5–5.2)
ALBUMIN/GLOB SERPL: 1.7 G/DL
ALP SERPL-CCNC: 56 U/L (ref 39–117)
ALT SERPL W P-5'-P-CCNC: 37 U/L (ref 1–33)
ANION GAP SERPL CALCULATED.3IONS-SCNC: 11.9 MMOL/L (ref 5–15)
AST SERPL-CCNC: 35 U/L (ref 1–32)
ATMOSPHERIC PRESS: 751.2 MMHG
BACTERIA UR QL AUTO: ABNORMAL /HPF
BASE EXCESS BLDV CALC-SCNC: -3.2 MMOL/L (ref -2–2)
BASOPHILS # BLD AUTO: 0.05 10*3/MM3 (ref 0–0.2)
BASOPHILS NFR BLD AUTO: 0.5 % (ref 0–1.5)
BILIRUB SERPL-MCNC: 0.4 MG/DL (ref 0–1.2)
BILIRUB UR QL STRIP: NEGATIVE
BUN SERPL-MCNC: 20 MG/DL (ref 6–20)
BUN/CREAT SERPL: 23.5 (ref 7–25)
CALCIUM SPEC-SCNC: 9.6 MG/DL (ref 8.6–10.5)
CHLORIDE SERPL-SCNC: 98 MMOL/L (ref 98–107)
CLARITY UR: CLEAR
CO2 BLDA-SCNC: 21.9 MMOL/L (ref 23–27)
CO2 SERPL-SCNC: 22.1 MMOL/L (ref 22–29)
COLOR UR: ABNORMAL
CREAT SERPL-MCNC: 0.85 MG/DL (ref 0.57–1)
DEPRECATED RDW RBC AUTO: 43.7 FL (ref 37–54)
DEVICE COMMENT: ABNORMAL
EGFRCR SERPLBLD CKD-EPI 2021: 90.1 ML/MIN/1.73
EOSINOPHIL # BLD AUTO: 0.02 10*3/MM3 (ref 0–0.4)
EOSINOPHIL NFR BLD AUTO: 0.2 % (ref 0.3–6.2)
ERYTHROCYTE [DISTWIDTH] IN BLOOD BY AUTOMATED COUNT: 12.5 % (ref 12.3–15.4)
GLOBULIN UR ELPH-MCNC: 2.6 GM/DL
GLUCOSE SERPL-MCNC: 266 MG/DL (ref 65–99)
GLUCOSE UR STRIP-MCNC: ABNORMAL MG/DL
HCG SERPL QL: NEGATIVE
HCO3 BLDV-SCNC: 20.9 MMOL/L (ref 22–28)
HCT VFR BLD AUTO: 41 % (ref 34–46.6)
HGB BLD-MCNC: 13.2 G/DL (ref 12–15.9)
HGB UR QL STRIP.AUTO: NEGATIVE
HOLD SPECIMEN: NORMAL
HYALINE CASTS UR QL AUTO: ABNORMAL /LPF
IMM GRANULOCYTES # BLD AUTO: 0.06 10*3/MM3 (ref 0–0.05)
IMM GRANULOCYTES NFR BLD AUTO: 0.6 % (ref 0–0.5)
KETONES UR QL STRIP: ABNORMAL
LEUKOCYTE ESTERASE UR QL STRIP.AUTO: NEGATIVE
LIPASE SERPL-CCNC: 27 U/L (ref 13–60)
LYMPHOCYTES # BLD AUTO: 1.01 10*3/MM3 (ref 0.7–3.1)
LYMPHOCYTES NFR BLD AUTO: 10.2 % (ref 19.6–45.3)
MCH RBC QN AUTO: 31.4 PG (ref 26.6–33)
MCHC RBC AUTO-ENTMCNC: 32.2 G/DL (ref 31.5–35.7)
MCV RBC AUTO: 97.6 FL (ref 79–97)
MODALITY: ABNORMAL
MONOCYTES # BLD AUTO: 0.41 10*3/MM3 (ref 0.1–0.9)
MONOCYTES NFR BLD AUTO: 4.1 % (ref 5–12)
NEUTROPHILS NFR BLD AUTO: 8.39 10*3/MM3 (ref 1.7–7)
NEUTROPHILS NFR BLD AUTO: 84.4 % (ref 42.7–76)
NITRITE UR QL STRIP: POSITIVE
NRBC BLD AUTO-RTO: 0 /100 WBC (ref 0–0.2)
PCO2 BLDV: 33.7 MM HG (ref 41–51)
PH BLDV: 7.4 PH UNITS (ref 7.31–7.41)
PH UR STRIP.AUTO: 5.5 [PH] (ref 5–8)
PLATELET # BLD AUTO: 282 10*3/MM3 (ref 140–450)
PMV BLD AUTO: 8.9 FL (ref 6–12)
PO2 BLDV: 41 MM HG (ref 35–45)
POTASSIUM SERPL-SCNC: 4.4 MMOL/L (ref 3.5–5.2)
PROT SERPL-MCNC: 7.1 G/DL (ref 6–8.5)
PROT UR QL STRIP: ABNORMAL
RBC # BLD AUTO: 4.2 10*6/MM3 (ref 3.77–5.28)
RBC # UR STRIP: ABNORMAL /HPF
REF LAB TEST METHOD: ABNORMAL
SAO2 % BLDCOV: 76.9 % (ref 45–75)
SODIUM SERPL-SCNC: 132 MMOL/L (ref 136–145)
SP GR UR STRIP: >1.03 (ref 1–1.03)
SQUAMOUS #/AREA URNS HPF: ABNORMAL /HPF
TOTAL RATE: 20 BREATHS/MINUTE
UROBILINOGEN UR QL STRIP: ABNORMAL
WBC # UR STRIP: ABNORMAL /HPF
WBC NRBC COR # BLD AUTO: 9.94 10*3/MM3 (ref 3.4–10.8)
WHOLE BLOOD HOLD COAG: NORMAL
WHOLE BLOOD HOLD SPECIMEN: NORMAL

## 2024-08-27 PROCEDURE — 80053 COMPREHEN METABOLIC PANEL: CPT | Performed by: EMERGENCY MEDICINE

## 2024-08-27 PROCEDURE — 36415 COLL VENOUS BLD VENIPUNCTURE: CPT

## 2024-08-27 PROCEDURE — 87086 URINE CULTURE/COLONY COUNT: CPT | Performed by: EMERGENCY MEDICINE

## 2024-08-27 PROCEDURE — 25010000002 ONDANSETRON PER 1 MG: Performed by: EMERGENCY MEDICINE

## 2024-08-27 PROCEDURE — 96376 TX/PRO/DX INJ SAME DRUG ADON: CPT

## 2024-08-27 PROCEDURE — 82803 BLOOD GASES ANY COMBINATION: CPT | Performed by: EMERGENCY MEDICINE

## 2024-08-27 PROCEDURE — 25010000002 HYDROMORPHONE PER 4 MG: Performed by: EMERGENCY MEDICINE

## 2024-08-27 PROCEDURE — 96374 THER/PROPH/DIAG INJ IV PUSH: CPT

## 2024-08-27 PROCEDURE — 84703 CHORIONIC GONADOTROPIN ASSAY: CPT | Performed by: EMERGENCY MEDICINE

## 2024-08-27 PROCEDURE — 74176 CT ABD & PELVIS W/O CONTRAST: CPT

## 2024-08-27 PROCEDURE — 25010000002 MORPHINE PER 10 MG: Performed by: EMERGENCY MEDICINE

## 2024-08-27 PROCEDURE — 81001 URINALYSIS AUTO W/SCOPE: CPT | Performed by: EMERGENCY MEDICINE

## 2024-08-27 PROCEDURE — 85025 COMPLETE CBC W/AUTO DIFF WBC: CPT | Performed by: EMERGENCY MEDICINE

## 2024-08-27 PROCEDURE — 25810000003 SODIUM CHLORIDE 0.9 % SOLUTION: Performed by: EMERGENCY MEDICINE

## 2024-08-27 PROCEDURE — 96375 TX/PRO/DX INJ NEW DRUG ADDON: CPT

## 2024-08-27 PROCEDURE — 83690 ASSAY OF LIPASE: CPT | Performed by: EMERGENCY MEDICINE

## 2024-08-27 PROCEDURE — 83036 HEMOGLOBIN GLYCOSYLATED A1C: CPT

## 2024-08-27 PROCEDURE — 99285 EMERGENCY DEPT VISIT HI MDM: CPT

## 2024-08-27 RX ORDER — ONDANSETRON 2 MG/ML
4 INJECTION INTRAMUSCULAR; INTRAVENOUS ONCE
Status: COMPLETED | OUTPATIENT
Start: 2024-08-27 | End: 2024-08-27

## 2024-08-27 RX ORDER — HYDROMORPHONE HYDROCHLORIDE 1 MG/ML
0.5 INJECTION, SOLUTION INTRAMUSCULAR; INTRAVENOUS; SUBCUTANEOUS ONCE
Status: COMPLETED | OUTPATIENT
Start: 2024-08-27 | End: 2024-08-27

## 2024-08-27 RX ORDER — MORPHINE SULFATE 2 MG/ML
2 INJECTION, SOLUTION INTRAMUSCULAR; INTRAVENOUS ONCE
Status: COMPLETED | OUTPATIENT
Start: 2024-08-27 | End: 2024-08-27

## 2024-08-27 RX ORDER — SODIUM CHLORIDE 0.9 % (FLUSH) 0.9 %
10 SYRINGE (ML) INJECTION AS NEEDED
Status: DISCONTINUED | OUTPATIENT
Start: 2024-08-27 | End: 2024-08-28 | Stop reason: HOSPADM

## 2024-08-27 RX ADMIN — SODIUM CHLORIDE 1000 ML: 9 INJECTION, SOLUTION INTRAVENOUS at 21:18

## 2024-08-27 RX ADMIN — ONDANSETRON 4 MG: 2 INJECTION, SOLUTION INTRAMUSCULAR; INTRAVENOUS at 21:24

## 2024-08-27 RX ADMIN — ONDANSETRON 4 MG: 2 INJECTION, SOLUTION INTRAMUSCULAR; INTRAVENOUS at 23:34

## 2024-08-27 RX ADMIN — MORPHINE SULFATE 2 MG: 2 INJECTION, SOLUTION INTRAMUSCULAR; INTRAVENOUS at 21:24

## 2024-08-27 RX ADMIN — HYDROMORPHONE HYDROCHLORIDE 0.5 MG: 1 INJECTION, SOLUTION INTRAMUSCULAR; INTRAVENOUS; SUBCUTANEOUS at 23:34

## 2024-08-28 VITALS
SYSTOLIC BLOOD PRESSURE: 126 MMHG | OXYGEN SATURATION: 98 % | DIASTOLIC BLOOD PRESSURE: 83 MMHG | RESPIRATION RATE: 18 BRPM | HEART RATE: 66 BPM | WEIGHT: 218.1 LBS | BODY MASS INDEX: 34.23 KG/M2 | HEIGHT: 67 IN | TEMPERATURE: 98.4 F

## 2024-08-28 PROBLEM — N20.0 KIDNEY STONE: Status: ACTIVE | Noted: 2024-08-28

## 2024-08-28 LAB
ANION GAP SERPL CALCULATED.3IONS-SCNC: 10.4 MMOL/L (ref 5–15)
BUN SERPL-MCNC: 14 MG/DL (ref 6–20)
BUN/CREAT SERPL: 20 (ref 7–25)
CALCIUM SPEC-SCNC: 8.8 MG/DL (ref 8.6–10.5)
CHLORIDE SERPL-SCNC: 100 MMOL/L (ref 98–107)
CO2 SERPL-SCNC: 19.6 MMOL/L (ref 22–29)
CREAT SERPL-MCNC: 0.7 MG/DL (ref 0.57–1)
DEPRECATED RDW RBC AUTO: 40.9 FL (ref 37–54)
EGFRCR SERPLBLD CKD-EPI 2021: 113.7 ML/MIN/1.73
ERYTHROCYTE [DISTWIDTH] IN BLOOD BY AUTOMATED COUNT: 11.7 % (ref 12.3–15.4)
GLUCOSE BLDC GLUCOMTR-MCNC: 126 MG/DL (ref 70–130)
GLUCOSE BLDC GLUCOMTR-MCNC: 162 MG/DL (ref 70–130)
GLUCOSE SERPL-MCNC: 141 MG/DL (ref 65–99)
HBA1C MFR BLD: 6.8 % (ref 4.8–5.6)
HCT VFR BLD AUTO: 37.4 % (ref 34–46.6)
HGB BLD-MCNC: 12.3 G/DL (ref 12–15.9)
MCH RBC QN AUTO: 31.7 PG (ref 26.6–33)
MCHC RBC AUTO-ENTMCNC: 32.9 G/DL (ref 31.5–35.7)
MCV RBC AUTO: 96.4 FL (ref 79–97)
PLATELET # BLD AUTO: 243 10*3/MM3 (ref 140–450)
PMV BLD AUTO: 9.2 FL (ref 6–12)
POTASSIUM SERPL-SCNC: 4.5 MMOL/L (ref 3.5–5.2)
RBC # BLD AUTO: 3.88 10*6/MM3 (ref 3.77–5.28)
SODIUM SERPL-SCNC: 130 MMOL/L (ref 136–145)
WBC NRBC COR # BLD AUTO: 9.76 10*3/MM3 (ref 3.4–10.8)

## 2024-08-28 PROCEDURE — 25010000002 CEFTRIAXONE PER 250 MG: Performed by: EMERGENCY MEDICINE

## 2024-08-28 PROCEDURE — G0378 HOSPITAL OBSERVATION PER HR: HCPCS

## 2024-08-28 PROCEDURE — 63710000001 INSULIN REGULAR HUMAN PER 5 UNITS

## 2024-08-28 PROCEDURE — 82948 REAGENT STRIP/BLOOD GLUCOSE: CPT

## 2024-08-28 PROCEDURE — 85027 COMPLETE CBC AUTOMATED: CPT

## 2024-08-28 PROCEDURE — 80048 BASIC METABOLIC PNL TOTAL CA: CPT

## 2024-08-28 PROCEDURE — 25810000003 SODIUM CHLORIDE 0.9 % SOLUTION

## 2024-08-28 RX ORDER — ACETAMINOPHEN 160 MG/5ML
650 SOLUTION ORAL EVERY 4 HOURS PRN
Status: DISCONTINUED | OUTPATIENT
Start: 2024-08-28 | End: 2024-08-28 | Stop reason: HOSPADM

## 2024-08-28 RX ORDER — ONDANSETRON 2 MG/ML
4 INJECTION INTRAMUSCULAR; INTRAVENOUS EVERY 6 HOURS PRN
Status: DISCONTINUED | OUTPATIENT
Start: 2024-08-28 | End: 2024-08-28 | Stop reason: HOSPADM

## 2024-08-28 RX ORDER — TAMSULOSIN HYDROCHLORIDE 0.4 MG/1
1 CAPSULE ORAL NIGHTLY
Qty: 30 CAPSULE | Refills: 0 | Status: SHIPPED | OUTPATIENT
Start: 2024-08-28

## 2024-08-28 RX ORDER — MONTELUKAST SODIUM 10 MG/1
10 TABLET ORAL NIGHTLY
Status: DISCONTINUED | OUTPATIENT
Start: 2024-08-28 | End: 2024-08-28 | Stop reason: HOSPADM

## 2024-08-28 RX ORDER — OXYCODONE AND ACETAMINOPHEN 5; 325 MG/1; MG/1
1 TABLET ORAL EVERY 8 HOURS PRN
Status: DISCONTINUED | OUTPATIENT
Start: 2024-08-28 | End: 2024-08-28 | Stop reason: HOSPADM

## 2024-08-28 RX ORDER — BISACODYL 5 MG/1
5 TABLET, DELAYED RELEASE ORAL DAILY PRN
Status: DISCONTINUED | OUTPATIENT
Start: 2024-08-28 | End: 2024-08-28 | Stop reason: HOSPADM

## 2024-08-28 RX ORDER — ONDANSETRON 4 MG/1
4 TABLET, ORALLY DISINTEGRATING ORAL EVERY 6 HOURS PRN
Status: DISCONTINUED | OUTPATIENT
Start: 2024-08-28 | End: 2024-08-28 | Stop reason: HOSPADM

## 2024-08-28 RX ORDER — HYDROMORPHONE HYDROCHLORIDE 1 MG/ML
0.5 INJECTION, SOLUTION INTRAMUSCULAR; INTRAVENOUS; SUBCUTANEOUS EVERY 4 HOURS PRN
Status: DISCONTINUED | OUTPATIENT
Start: 2024-08-28 | End: 2024-08-28 | Stop reason: HOSPADM

## 2024-08-28 RX ORDER — POLYETHYLENE GLYCOL 3350 17 G/17G
17 POWDER, FOR SOLUTION ORAL DAILY PRN
Status: DISCONTINUED | OUTPATIENT
Start: 2024-08-28 | End: 2024-08-28 | Stop reason: HOSPADM

## 2024-08-28 RX ORDER — CEPHALEXIN 500 MG/1
500 CAPSULE ORAL 3 TIMES DAILY
Qty: 21 CAPSULE | Refills: 0 | Status: SHIPPED | OUTPATIENT
Start: 2024-08-28 | End: 2024-09-04

## 2024-08-28 RX ORDER — SODIUM CHLORIDE 0.9 % (FLUSH) 0.9 %
10 SYRINGE (ML) INJECTION AS NEEDED
Status: DISCONTINUED | OUTPATIENT
Start: 2024-08-28 | End: 2024-08-28 | Stop reason: HOSPADM

## 2024-08-28 RX ORDER — SODIUM CHLORIDE 9 MG/ML
40 INJECTION, SOLUTION INTRAVENOUS AS NEEDED
Status: DISCONTINUED | OUTPATIENT
Start: 2024-08-28 | End: 2024-08-28 | Stop reason: HOSPADM

## 2024-08-28 RX ORDER — TAMSULOSIN HYDROCHLORIDE 0.4 MG/1
0.4 CAPSULE ORAL DAILY
Status: DISCONTINUED | OUTPATIENT
Start: 2024-08-28 | End: 2024-08-28 | Stop reason: HOSPADM

## 2024-08-28 RX ORDER — SODIUM CHLORIDE 0.9 % (FLUSH) 0.9 %
10 SYRINGE (ML) INJECTION EVERY 12 HOURS SCHEDULED
Status: DISCONTINUED | OUTPATIENT
Start: 2024-08-28 | End: 2024-08-28 | Stop reason: HOSPADM

## 2024-08-28 RX ORDER — DEXTROSE MONOHYDRATE 25 G/50ML
25 INJECTION, SOLUTION INTRAVENOUS
Status: DISCONTINUED | OUTPATIENT
Start: 2024-08-28 | End: 2024-08-28 | Stop reason: HOSPADM

## 2024-08-28 RX ORDER — BISACODYL 10 MG
10 SUPPOSITORY, RECTAL RECTAL DAILY PRN
Status: DISCONTINUED | OUTPATIENT
Start: 2024-08-28 | End: 2024-08-28 | Stop reason: HOSPADM

## 2024-08-28 RX ORDER — ACETAMINOPHEN 650 MG/1
650 SUPPOSITORY RECTAL EVERY 4 HOURS PRN
Status: DISCONTINUED | OUTPATIENT
Start: 2024-08-28 | End: 2024-08-28 | Stop reason: HOSPADM

## 2024-08-28 RX ORDER — HYDROCODONE BITARTRATE AND ACETAMINOPHEN 5; 325 MG/1; MG/1
1 TABLET ORAL EVERY 6 HOURS PRN
Qty: 20 TABLET | Refills: 0 | Status: SHIPPED | OUTPATIENT
Start: 2024-08-28

## 2024-08-28 RX ORDER — ACETAMINOPHEN 325 MG/1
650 TABLET ORAL EVERY 4 HOURS PRN
Status: DISCONTINUED | OUTPATIENT
Start: 2024-08-28 | End: 2024-08-28 | Stop reason: HOSPADM

## 2024-08-28 RX ORDER — SODIUM CHLORIDE 9 MG/ML
125 INJECTION, SOLUTION INTRAVENOUS CONTINUOUS
Status: DISCONTINUED | OUTPATIENT
Start: 2024-08-28 | End: 2024-08-28 | Stop reason: HOSPADM

## 2024-08-28 RX ORDER — NICOTINE POLACRILEX 4 MG
15 LOZENGE BUCCAL
Status: DISCONTINUED | OUTPATIENT
Start: 2024-08-28 | End: 2024-08-28 | Stop reason: HOSPADM

## 2024-08-28 RX ORDER — AMOXICILLIN 250 MG
2 CAPSULE ORAL 2 TIMES DAILY PRN
Status: DISCONTINUED | OUTPATIENT
Start: 2024-08-28 | End: 2024-08-28 | Stop reason: HOSPADM

## 2024-08-28 RX ORDER — IBUPROFEN 600 MG/1
1 TABLET ORAL
Status: DISCONTINUED | OUTPATIENT
Start: 2024-08-28 | End: 2024-08-28 | Stop reason: HOSPADM

## 2024-08-28 RX ADMIN — INSULIN HUMAN 3 UNITS: 100 INJECTION, SOLUTION PARENTERAL at 02:06

## 2024-08-28 RX ADMIN — Medication 10 ML: at 04:28

## 2024-08-28 RX ADMIN — CEFTRIAXONE 2000 MG: 2 INJECTION, POWDER, FOR SOLUTION INTRAMUSCULAR; INTRAVENOUS at 00:11

## 2024-08-28 RX ADMIN — OXYCODONE HYDROCHLORIDE AND ACETAMINOPHEN 1 TABLET: 5; 325 TABLET ORAL at 01:42

## 2024-08-28 RX ADMIN — Medication 10 ML: at 09:04

## 2024-08-28 RX ADMIN — TAMSULOSIN HYDROCHLORIDE 0.4 MG: 0.4 CAPSULE ORAL at 08:53

## 2024-08-28 RX ADMIN — SODIUM CHLORIDE 125 ML/HR: 9 INJECTION, SOLUTION INTRAVENOUS at 04:28

## 2024-08-28 NOTE — ED PROVIDER NOTES
EMERGENCY DEPARTMENT ENCOUNTER  Room Number:  22/22  PCP: Jenny Garrett MD  Independent Historians: Patient      HPI:  Chief Complaint: had concerns including Abdominal Pain.     A complete HPI/ROS/PMH/PSH/SH/FH are unobtainable due to: None    Chronic or social conditions impacting patient care (Social Determinants of Health): None      Context: Nandini Morrison is a 38 y.o. female with a medical history of kidney stones, diabetes who presents to the ED c/o acute left lower quadrant pain.  The patient reports that she has a history of kidney stones.  She reports since 4 PM today she has had left groin pain.  She reports it feels like prior kidney stone only worse.  She reports nausea.  She denies fevers.  She reports she had some hematuria a few days ago but none today.  She took Zofran and 2 Percocets prior to arrival.      Review of prior external notes (non-ED) -and- Review of prior external test results outside of this encounter:  Laboratory evaluation 8/29/2023 shows CMP with an elevated glucose of 214    Prescription drug monitoring program review:         PAST MEDICAL HISTORY  Active Ambulatory Problems     Diagnosis Date Noted    Uncontrolled type 1 diabetes mellitus 05/20/2016    Type 1 diabetes mellitus 05/20/2016    Diabetes mellitus type I 05/20/2016    Vitamin D deficiency disease 10/04/2016    Abnormal weight gain 10/24/2019    Hyperlipidemia 04/27/2020     Resolved Ambulatory Problems     Diagnosis Date Noted    Nontoxic multinodular goiter 02/06/2017     Past Medical History:   Diagnosis Date    Ureteral calculus     Vitamin D deficiency          PAST SURGICAL HISTORY  Past Surgical History:   Procedure Laterality Date    CYSTOSCOPY W/ URETERAL STENT PLACEMENT Right 12/22/2022    Procedure: RIGHT CYSTOSCOPY URETERAL STENT INSERTION URETEROSCOPY;  Surgeon: Igor Ho MD;  Location: [facility];  Service: Urology;  Laterality: Right;    URETEROSCOPY LASER LITHOTRIPSY WITH STENT INSERTION  Right 1/12/2023    Procedure: URETEROSCOPY LASER LITHOTRIPSY STONE BASKET EXTRACTION STENT REPLACEMENT, right retrograde pyelogram;  Surgeon: Igor Ho MD;  Location: [facility];  Service: Urology;  Laterality: Right;         FAMILY HISTORY  Family History   Problem Relation Age of Onset    Diabetes Maternal Aunt     Hypertension Maternal Aunt     Malig Hyperthermia Neg Hx          SOCIAL HISTORY  Social History     Socioeconomic History    Marital status: Single   Tobacco Use    Smoking status: Never    Smokeless tobacco: Never   Vaping Use    Vaping status: Never Used   Substance and Sexual Activity    Alcohol use: Yes     Comment: socially     Drug use: Never         ALLERGIES  Amoxicillin      REVIEW OF SYSTEMS  Review of Systems  Included in HPI  All systems reviewed and negative except for those discussed in HPI.      PHYSICAL EXAM    I have reviewed the triage vital signs and nursing notes.    ED Triage Vitals   Temp Heart Rate Resp BP SpO2   08/27/24 2017 08/27/24 2017 08/27/24 2017 08/27/24 2025 08/27/24 2017   96.6 °F (35.9 °C) 67 18 143/89 100 %      Temp src Heart Rate Source Patient Position BP Location FiO2 (%)   08/27/24 2017 08/27/24 2017 08/27/24 2025 08/27/24 2025 --   Tympanic Monitor Sitting Left arm        Physical Exam  GENERAL: Awake, alert, no acute distress  SKIN: Warm, dry  HENT: Normocephalic, atraumatic  EYES: no scleral icterus  CV: regular rhythm, regular rate  RESPIRATORY: normal effort, lungs clear  ABDOMEN: soft, mild to moderate tenderness in the left lower quadrant, nondistended  MUSCULOSKELETAL: no deformity  NEURO: alert, moves all extremities, follows commands            LAB RESULTS  Recent Results (from the past 24 hour(s))   Comprehensive Metabolic Panel    Collection Time: 08/27/24  8:38 PM    Specimen: Blood   Result Value Ref Range    Glucose 266 (H) 65 - 99 mg/dL    BUN 20 6 - 20 mg/dL    Creatinine 0.85 0.57 - 1.00 mg/dL    Sodium 132 (L) 136 - 145 mmol/L     Potassium 4.4 3.5 - 5.2 mmol/L    Chloride 98 98 - 107 mmol/L    CO2 22.1 22.0 - 29.0 mmol/L    Calcium 9.6 8.6 - 10.5 mg/dL    Total Protein 7.1 6.0 - 8.5 g/dL    Albumin 4.5 3.5 - 5.2 g/dL    ALT (SGPT) 37 (H) 1 - 33 U/L    AST (SGOT) 35 (H) 1 - 32 U/L    Alkaline Phosphatase 56 39 - 117 U/L    Total Bilirubin 0.4 0.0 - 1.2 mg/dL    Globulin 2.6 gm/dL    A/G Ratio 1.7 g/dL    BUN/Creatinine Ratio 23.5 7.0 - 25.0    Anion Gap 11.9 5.0 - 15.0 mmol/L    eGFR 90.1 >60.0 mL/min/1.73   Lipase    Collection Time: 08/27/24  8:38 PM    Specimen: Blood   Result Value Ref Range    Lipase 27 13 - 60 U/L   hCG, Serum, Qualitative    Collection Time: 08/27/24  8:38 PM    Specimen: Blood   Result Value Ref Range    HCG Qualitative Negative Negative   Green Top (Gel)    Collection Time: 08/27/24  8:38 PM   Result Value Ref Range    Extra Tube Hold for add-ons.    Lavender Top    Collection Time: 08/27/24  8:38 PM   Result Value Ref Range    Extra Tube hold for add-on    Light Blue Top    Collection Time: 08/27/24  8:38 PM   Result Value Ref Range    Extra Tube Hold for add-ons.    CBC Auto Differential    Collection Time: 08/27/24  8:38 PM    Specimen: Blood   Result Value Ref Range    WBC 9.94 3.40 - 10.80 10*3/mm3    RBC 4.20 3.77 - 5.28 10*6/mm3    Hemoglobin 13.2 12.0 - 15.9 g/dL    Hematocrit 41.0 34.0 - 46.6 %    MCV 97.6 (H) 79.0 - 97.0 fL    MCH 31.4 26.6 - 33.0 pg    MCHC 32.2 31.5 - 35.7 g/dL    RDW 12.5 12.3 - 15.4 %    RDW-SD 43.7 37.0 - 54.0 fl    MPV 8.9 6.0 - 12.0 fL    Platelets 282 140 - 450 10*3/mm3    Neutrophil % 84.4 (H) 42.7 - 76.0 %    Lymphocyte % 10.2 (L) 19.6 - 45.3 %    Monocyte % 4.1 (L) 5.0 - 12.0 %    Eosinophil % 0.2 (L) 0.3 - 6.2 %    Basophil % 0.5 0.0 - 1.5 %    Immature Grans % 0.6 (H) 0.0 - 0.5 %    Neutrophils, Absolute 8.39 (H) 1.70 - 7.00 10*3/mm3    Lymphocytes, Absolute 1.01 0.70 - 3.10 10*3/mm3    Monocytes, Absolute 0.41 0.10 - 0.90 10*3/mm3    Eosinophils, Absolute 0.02 0.00 - 0.40  10*3/mm3    Basophils, Absolute 0.05 0.00 - 0.20 10*3/mm3    Immature Grans, Absolute 0.06 (H) 0.00 - 0.05 10*3/mm3    nRBC 0.0 0.0 - 0.2 /100 WBC   Urinalysis With Microscopic If Indicated (No Culture) - Urine, Clean Catch    Collection Time: 08/27/24  9:02 PM    Specimen: Urine, Clean Catch   Result Value Ref Range    Color, UA Dark Yellow (A) Yellow, Straw    Appearance, UA Clear Clear    pH, UA 5.5 5.0 - 8.0    Specific Gravity, UA >1.030 (H) 1.005 - 1.030    Glucose,  mg/dL (1+) (A) Negative    Ketones, UA 40 mg/dL (2+) (A) Negative    Bilirubin, UA Negative Negative    Blood, UA Negative Negative    Protein, UA Trace (A) Negative    Leuk Esterase, UA Negative Negative    Nitrite, UA Positive (A) Negative    Urobilinogen, UA 1.0 E.U./dL 0.2 - 1.0 E.U./dL   Urinalysis, Microscopic Only - Urine, Clean Catch    Collection Time: 08/27/24  9:02 PM    Specimen: Urine, Clean Catch   Result Value Ref Range    RBC, UA 0-2 None Seen, 0-2 /HPF    WBC, UA 3-5 (A) None Seen, 0-2 /HPF    Bacteria, UA None Seen None Seen /HPF    Squamous Epithelial Cells, UA 0-2 None Seen, 0-2 /HPF    Hyaline Casts, UA 0-2 None Seen /LPF    Methodology Automated Microscopy    Blood Gas, Venous -    Collection Time: 08/27/24 11:28 PM    Specimen: Venous Blood   Result Value Ref Range    pH, Venous 7.400 7.310 - 7.410 pH Units    pCO2, Venous 33.7 (L) 41.0 - 51.0 mm Hg    pO2, Venous 41.0 35.0 - 45.0 mm Hg    HCO3, Venous 20.9 (L) 22.0 - 28.0 mmol/L    Base Excess, Venous -3.2 (L) -2.0 - 2.0 mmol/L    O2 Saturation, Venous 76.9 (H) 45.0 - 75.0 %    CO2 Content 21.9 (L) 23 - 27 mmol/L    Barometric Pressure for Blood Gas 751.2000 mmHg    Modality Room Air     Rate 20 Breaths/minute    Device Comment Sat 100%          RADIOLOGY  CT Abdomen Pelvis Without Contrast    Result Date: 8/27/2024  CT ABDOMEN PELVIS WO CONTRAST-  Radiation dose reduction techniques were utilized, including automated exposure control and exposure modulation based on  body size.  Clinical: Left leg pain  COMPARISON examination 12/20/2022  FINDINGS: 1. Located within the proximal left ureter, 4 cm below the UPJ, there is a 5 mm ureteral calculus which is causing mild hydronephrosis. The ureter beyond this point is collapsed to the bladder. There is perinephric fat stranding and fluid worrisome for pyelonephritis or forniceal rupture. 2 other small left-sided renal calculi seen. The adrenal glands and right kidney are normal, no right-sided obstructive uropathy. The urinary bladder is typical in appearance.  2. The liver, gallbladder, pancreas, spleen and ovaries are normal. Uterine fundal fibroid is again demonstrated, projecting posteriorly and measuring approximately 3.6 cm.  3. The appendix, colon and small bowel have a satisfactory appearance. Stomach and duodenum within normal limits. Diameter of the aorta is normal. Few small lymph nodes seen in the left periaortic space, similar to the previous examination.  CONCLUSION: Ureteral calculus 4 cm below the left UPJ causing mild hydronephrosis, perinephric fat stranding and fluid worrisome for pyelonephritis or forniceal rupture.     This report was finalized on 8/27/2024 10:35 PM by Dr. Zach Smiley M.D on Workstation: [id number]         MEDICATIONS GIVEN IN ER  Medications   sodium chloride 0.9 % flush 10 mL (has no administration in time range)   cefTRIAXone (ROCEPHIN) 2,000 mg in sodium chloride 0.9 % 100 mL MBP (has no administration in time range)   sodium chloride 0.9 % bolus 1,000 mL (0 mL Intravenous Stopped 8/27/24 2148)   ondansetron (ZOFRAN) injection 4 mg (4 mg Intravenous Given 8/27/24 2124)   morphine injection 2 mg (2 mg Intravenous Given 8/27/24 2124)   HYDROmorphone (DILAUDID) injection 0.5 mg (0.5 mg Intravenous Given 8/27/24 2334)   ondansetron (ZOFRAN) injection 4 mg (4 mg Intravenous Given 8/27/24 2334)         ORDERS PLACED DURING THIS VISIT:  Orders Placed This Encounter   Procedures    Urine Culture -  Urine,    CT Abdomen Pelvis Without Contrast    [place] Draw    Comprehensive Metabolic Panel    Lipase    Urinalysis With Microscopic If Indicated (No Culture) - Urine, Clean Catch    hCG, Serum, Qualitative    CBC Auto Differential    Urinalysis, Microscopic Only - Urine, Clean Catch    Blood Gas, Venous -    NPO Diet NPO Type: Strict NPO    Undress & Gown    Urology (on-call MD unless specified)    Insert Peripheral IV    Initiate ED Observation Status    CBC & Differential    Green Top (Gel)    Lavender Top    Light Blue Top         OUTPATIENT MEDICATION MANAGEMENT:  Current Facility-Administered Medications Ordered in Epic   Medication Dose Route Frequency Provider Last Rate Last Admin    cefTRIAXone (ROCEPHIN) 2,000 mg in sodium chloride 0.9 % 100 mL MBP  2,000 mg Intravenous Once True Randall MD        sodium chloride 0.9 % flush 10 mL  10 mL Intravenous PRN True Randall MD         Current Outpatient Medications Ordered in Epic   Medication Sig Dispense Refill    albuterol sulfate  (90 Base) MCG/ACT inhaler INHALE 2 PUFFS INTO THE LUNGS EVERY 4-6 HOURS AS NEEDED FOR COUGH. USE WITH VORTEX      dextroamphetamine (DEXTROSTAT) 10 MG tablet       fexofenadine (ALLEGRA) 180 MG tablet Take 1 tablet by mouth Daily.      Glucagon (Baqsimi Two Pack) 3 MG/DOSE powder 3 mg into the nostril(s) as directed by provider As Needed (hypoglycemia). 1 each 1    glucagon (GLUCAGON EMERGENCY) 1 MG injection Inject 1 mg under the skin into the appropriate area as directed 1 (One) Time As Needed for Low Blood Sugar for up to 1 dose. 1 kit 5    insulin aspart (NovoLOG FlexPen) 100 UNIT/ML solution pen-injector sc pen Inject 50 Units under the skin into the appropriate area as directed Daily for 180 days. INJECT UP TO 50 UNITS SUBCUTANEOUSLY DAILY. 1u per 10 carbs and 1u for every 50/100  Indications: Insulin-Dependent Diabetes 45 mL 0    montelukast (SINGULAIR) 10 MG tablet Take 1 tablet by mouth every night at  "bedtime.      Tresiba FlexTouch 200 UNIT/ML solution pen-injector pen injection Inject 20 Units under the skin into the appropriate area as directed Daily. Indications: Insulin-Dependent Diabetes 10 mL 1    Vyvanse 60 MG chewable tablet Chew 1 tablet Every Morning      Continuous Blood Gluc Transmit (Dexcom G6 Transmitter) misc Use 1 each Every 3 (Three) Months. Dispense 1 Dexcom G6 Transmitter for each 3 month period (NDC #[address]-88). Check 6 times a day. Dx: E 10.65  Indications: Insulin-Dependent Diabetes 1 each 0    Continuous Glucose Sensor (Dexcom G6 Sensor) Apply  topically Every 10 (Ten) Days. USE AS DIRECTED TO MONITOR BLOOD SUGAR. REPLACE EVERY 10 DAYS.  Indications: Insulin-Dependent Diabetes 9 each 1    Insulin Pen Needle (RELION PEN NEEDLE 31G/8MM) 31G X 8 MM misc For use with pen device up to 6 times a day. Can substitute based on availability and insurance. E10.65 500 each 4    Multiple Vitamins-Minerals (MULTIVITAMIN PO) Take 1 tablet by mouth Daily.      Needle, Disp, (BD DISP NEEDLES) 25G X 5/8\" misc 4 times a day for insulin injection. 400 each 3    Pulmicort Flexhaler 90 MCG/ACT inhaler INHALE 2 PUFFS EVERY MORNING           PROCEDURES  Procedures            PROGRESS, DATA ANALYSIS, CONSULTS, AND MEDICAL DECISION MAKING  All labs have been independently interpreted by me.  All radiology studies have been reviewed by me. All EKG's have been independently viewed and interpreted by me.  Discussion below represents my analysis of pertinent findings related to patient's condition, differential diagnosis, treatment plan and final disposition.    Differential diagnosis includes but is not limited to kidney stone, UTI, pyelonephritis, pregnancy, ectopic pregnancy.    Clinical Scores:                   ED Course as of 08/28/24 0009   Tue Aug 27, 2024   2149 WBC: 9.94 [TR]   2149 Creatinine: 0.85 [TR]   2149 CO2: 22.1 [TR]   2219 CT Abdomen Pelvis Without Contrast  My independent interpretation of the " imaging study is left hydronephrosis [TR]   2338 pH, Venous: 7.400 [TR]   2359 Discussing with urology via phone.  They request medicine admission.  Plan potential procedure in the morning.  N.p.o. after midnight. [TR]   Wed Aug 28, 2024   0008 I reviewed the workup and findings with the patient at the bedside.  Answered all questions.  Plan admission.  They are agreeable. [TR]   0008 I discussed with Natalie Sandhu with the observation unit.  She accepts for admission. [TR]      ED Course User Index  [TR] True Randall MD             AS OF 00:09 EDT VITALS:    BP - 137/87  HR - 55  TEMP - 96.6 °F (35.9 °C) (Tympanic)  O2 SATS - 100%    COMPLEXITY OF CARE  The patient requires admission.      DIAGNOSIS  Final diagnoses:   Type 1 diabetes mellitus without complication   Ureterolithiasis         DISPOSITION  ED Disposition       ED Disposition   Decision to Admit    Condition   --    Comment   --                Please note that portions of this document were completed with a voice recognition program.    Note Disclaimer: At [facility], we believe that sharing information builds trust and better relationships. You are receiving this note because you recently visited [facility]. It is possible you will see health information before a provider has talked with you about it. This kind of information can be easy to misunderstand. To help you fully understand what it means for your health, we urge you to discuss this note with your provider.         True Randall MD  08/28/24 0009

## 2024-08-28 NOTE — PLAN OF CARE
Goal Outcome Evaluation:      Pt dc via private vehicle with belongings. Education provided and questions encouraged/answered.

## 2024-08-28 NOTE — H&P
[facility]   HISTORY AND PHYSICAL    Patient Name: Nandini Morrison  : 1985  MRN: [id number]  Primary Care Physician:  Jenny Garrett MD  Date of admission: 2024    Subjective   Subjective     Chief Complaint:   Chief Complaint   Patient presents with    Abdominal Pain         HPI:    Nandini Morrison is a 38 y.o. female, with a past medical history including, but not limited to, type 1 diabetes, and kidney stones, presented to the emergency department with complaint of left lower quadrant pain.  She states that the pain is similar to when she had a kidney stone previously.  She states she also had nausea and vomiting.  Denies any fever, chills, urinary symptoms however, she states she did have some hematuria 2 to 3 days ago that has since cleared.  She states that her pain is controlled at this time.  Urology has been consulted to see the patient in the AM.  She will remain n.p.o. overnight.    Review of Systems   All systems were reviewed and negative except for: What was mentioned above in the HPI    Personal History     Past Medical History:   Diagnosis Date    Diabetes mellitus type I     Ureteral calculus     Vitamin D deficiency        Past Surgical History:   Procedure Laterality Date    CYSTOSCOPY W/ URETERAL STENT PLACEMENT Right 2022    Procedure: RIGHT CYSTOSCOPY URETERAL STENT INSERTION URETEROSCOPY;  Surgeon: Igor Ho MD;  Location: [facility];  Service: Urology;  Laterality: Right;    URETEROSCOPY LASER LITHOTRIPSY WITH STENT INSERTION Right 2023    Procedure: URETEROSCOPY LASER LITHOTRIPSY STONE BASKET EXTRACTION STENT REPLACEMENT, right retrograde pyelogram;  Surgeon: Igor Ho MD;  Location: [facility];  Service: Urology;  Laterality: Right;       Family History: family history includes Diabetes in her maternal aunt; Hypertension in her maternal aunt. Otherwise pertinent FHx was reviewed and not pertinent to current issue.    Social History:   reports that she has never smoked. She has never used smokeless tobacco. She reports current alcohol use. She reports that she does not use drugs.    Home Medications:  Dexcom G6 Sensor, Dexcom G6 Transmitter, Glucagon, Insulin Degludec, Insulin Pen Needle, Lisdexamfetamine Dimesylate, Needle (Disp), albuterol sulfate HFA, budesonide, dextroamphetamine, fexofenadine, glucagon, insulin aspart, montelukast, and multivitamin    Allergies:  Allergies   Allergen Reactions    Amoxicillin Rash       Objective   Objective     Vitals:   Temp:  [96.6 °F (35.9 °C)] 96.6 °F (35.9 °C)  Heart Rate:  [55-67] 55  Resp:  [18] 18  BP: (137-153)/(87-99) 137/87  Physical Exam   Constitutional: Awake, alert   Eyes: PERRLA   HENT: NCAT, mucous membranes moist   Neck: Supple   Respiratory: Clear to auscultation bilaterally, nonlabored respirations    Cardiovascular: regular rate, palpable pedal pulses bilaterally   Gastrointestinal: Positive bowel sounds, soft, nontender, nondistended   Musculoskeletal: No bilateral ankle edema   Psychiatric: Appropriate affect, cooperative   Neurologic: Oriented x 3, speech clear   Skin: No rashes       Result Review    Result Review:  I have personally reviewed the results from the time of this admission to 8/28/2024 00:15 EDT and agree with these findings:  [x]  Laboratory list / accordion  []  Microbiology  [x]  Radiology  []  EKG/Telemetry   []  Cardiology/Vascular   []  Pathology  [x]  Old records  []  Other:    Initial lab work in the emergency department shows a sodium of 132, glucose 266.  All other blood work is at baseline for the patient.  Urinalysis shows 2+ ketones, nitrate positive, trace of protein, 3-5 WBCs per high-powered field.  Urine culture is pending at this time.  CT abdomen pelvis shows located within the proximal left ureter 4 cm below the UPJ there is a 5 mm renal calculus that is causing mild hydronephrosis.  The ureter beyond this point is collapsed to the bladder.  There is  perinephritic fat stranding and fluid worrisome for pyelonephritis or forniceal rupture.      Assessment & Plan   Assessment / Plan     Brief Patient Summary:  Nandini Morrison is a 38 y.o. female who was admitted to the observation unit for further evaluation and treatment of her kidney stone and UTI.    Active Hospital Problems:  Active Hospital Problems    Diagnosis     **Kidney stone      Plan:     Kidney stone/UTI:  -Urology consult  -Pain control  -Vital signs every 4 hours  -N.p.o.   -As needed antiemetic  -Flomax 0.4 mg po daily  -Strain all urine  -NS @ 125 ml/hr   -Rocephin 2 g IV every 24 hours    Type 1 diabetes  -Accu-Cheks every 6 hours  -Continue home dose basal insulin  -Moderate high dose of correction insulin  -Hemoglobin [id number]        VTE Prophylaxis:  Mechanical VTE prophylaxis orders are present.        CODE STATUS:    Code Status (Patient has no pulse and is not breathing): CPR (Attempt to Resuscitate)  Medical Interventions (Patient has pulse or is breathing): Full Support    Admission Status:  I believe this patient meets observation status.    76 minutes have been spent by [facility] providers in the care of this patient while under observation status.      Appropriate PPE worn during patient encounter.  Hand hygeine performed before and after seeing the patient.      Electronically signed by JOSH Montilla, 08/28/24, 12:15 AM EDT.

## 2024-08-28 NOTE — CONSULTS
FIRST UROLOGY CONSULT      Patient Identification:  NAME:  Nandini Morrison  Age:  38 y.o.   Sex:  female   :  1985   MRN:  [id number]       Chief complaint:    Left flank pain    History of present illness:  Nandini Morrison is a 38 y.o. female presenting to the ER for left-sided flank pain, nausea.  CT scan shows 4 mm left-sided ureteral stone.    She has had 1 prior stone episode requiring ureteroscopy.    Lab Results   Component Value Date    CREATININE 0.70 2024     Lab Results   Component Value Date    WBC 9.76 2024       UA 2024: + for WBC; +nitrite;  no bacteria    Past medical history:  Past Medical History:   Diagnosis Date    Diabetes mellitus type I     Ureteral calculus     Vitamin D deficiency        Past surgical history:  Past Surgical History:   Procedure Laterality Date    CYSTOSCOPY W/ URETERAL STENT PLACEMENT Right 2022    Procedure: RIGHT CYSTOSCOPY URETERAL STENT INSERTION URETEROSCOPY;  Surgeon: Igor Ho MD;  Location: [facility];  Service: Urology;  Laterality: Right;    URETEROSCOPY LASER LITHOTRIPSY WITH STENT INSERTION Right 2023    Procedure: URETEROSCOPY LASER LITHOTRIPSY STONE BASKET EXTRACTION STENT REPLACEMENT, right retrograde pyelogram;  Surgeon: Igor Ho MD;  Location: [facility];  Service: Urology;  Laterality: Right;       Allergies:  Amoxicillin    Home medications:  Medications Prior to Admission   Medication Sig Dispense Refill Last Dose    albuterol sulfate  (90 Base) MCG/ACT inhaler INHALE 2 PUFFS INTO THE LUNGS EVERY 4-6 HOURS AS NEEDED FOR COUGH. USE WITH VORTEX   Past Week    dextroamphetamine (DEXTROSTAT) 10 MG tablet    2024    fexofenadine (ALLEGRA) 180 MG tablet Take 1 tablet by mouth Daily.   2024    Glucagon (Baqsimi Two Pack) 3 MG/DOSE powder 3 mg into the nostril(s) as directed by provider As Needed (hypoglycemia). 1 each 1     glucagon (GLUCAGON EMERGENCY) 1 MG injection Inject 1 mg  "under the skin into the appropriate area as directed 1 (One) Time As Needed for Low Blood Sugar for up to 1 dose. 1 kit 5     insulin aspart (NovoLOG FlexPen) 100 UNIT/ML solution pen-injector sc pen Inject 50 Units under the skin into the appropriate area as directed Daily for 180 days. INJECT UP TO 50 UNITS SUBCUTANEOUSLY DAILY. 1u per 10 carbs and 1u for every 50/100  Indications: Insulin-Dependent Diabetes 45 mL 0 8/27/2024    montelukast (SINGULAIR) 10 MG tablet Take 1 tablet by mouth every night at bedtime.   8/27/2024    Tresiba FlexTouch 200 UNIT/ML solution pen-injector pen injection Inject 20 Units under the skin into the appropriate area as directed Daily. Indications: Insulin-Dependent Diabetes 10 mL 1 8/27/2024    Vyvanse 60 MG chewable tablet Chew 1 tablet Every Morning   8/27/2024    Continuous Blood Gluc Transmit (Dexcom G6 Transmitter) misc Use 1 each Every 3 (Three) Months. Dispense 1 Dexcom G6 Transmitter for each 3 month period (NDC #[address]-56). Check 6 times a day. Dx: E 10.65  Indications: Insulin-Dependent Diabetes 1 each 0     Continuous Glucose Sensor (Dexcom G6 Sensor) Apply  topically Every 10 (Ten) Days. USE AS DIRECTED TO MONITOR BLOOD SUGAR. REPLACE EVERY 10 DAYS.  Indications: Insulin-Dependent Diabetes 9 each 1     Insulin Pen Needle (RELION PEN NEEDLE 31G/8MM) 31G X 8 MM misc For use with pen device up to 6 times a day. Can substitute based on availability and insurance. E10.65 500 each 4     Multiple Vitamins-Minerals (MULTIVITAMIN PO) Take 1 tablet by mouth Daily.       Needle, Disp, (BD DISP NEEDLES) 25G X 5/8\" misc 4 times a day for insulin injection. 400 each 3     Pulmicort Flexhaler 90 MCG/ACT inhaler INHALE 2 PUFFS EVERY MORNING           Hospital medications:  [START ON 8/29/2024] cefTRIAXone, 2,000 mg, Intravenous, Q24H  insulin glargine, 20 Units, Subcutaneous, Daily  insulin regular, 3-14 Units, Subcutaneous, Q6H  lidocaine (XYLOCAINE) 1 % 150 mg in sodium chloride " 0.9 % 250 mL IVPB, 1.5 mg/kg, Intravenous, Once  montelukast, 10 mg, Oral, Nightly  sodium chloride, 10 mL, Intravenous, Q12H  tamsulosin, 0.4 mg, Oral, Daily      sodium chloride, 125 mL/hr, Last Rate: 125 mL/hr (24 0732)        acetaminophen **OR** acetaminophen **OR** acetaminophen    senna-docusate sodium **AND** polyethylene glycol **AND** bisacodyl **AND** bisacodyl    Calcium Replacement - Follow Nurse / BPA Driven Protocol    dextrose    dextrose    glucagon (human recombinant)    HYDROmorphone    Magnesium Standard Dose Replacement - Follow Nurse / BPA Driven Protocol    ondansetron ODT **OR** ondansetron    oxyCODONE-acetaminophen    Phosphorus Replacement - Follow Nurse / BPA Driven Protocol    Potassium Replacement - Follow Nurse / BPA Driven Protocol    sodium chloride    sodium chloride    sodium chloride    Family history:  Family History   Problem Relation Age of Onset    Diabetes Maternal Aunt     Hypertension Maternal Aunt     Malig Hyperthermia Neg Hx        Social history:  Social History     Tobacco Use    Smoking status: Never    Smokeless tobacco: Never   Vaping Use    Vaping status: Never Used   Substance Use Topics    Alcohol use: Not Currently     Comment: socially     Drug use: Never       REVIEW OF SYSTEMS:  Constitutional - Negative for fevers/chills  Eyes/Ears/Nose/Mouth/Throat - Negative for changes in vision  Cardiovascular - Negative for chest pain, dysrhythmia  Respiratory - Negative for dyspnea  Gastrointestinal - +nausea   Genitourinary - see hpi  Hematologic/Lymphatic - Negative for bruising  Skin - Negative for erythema  Endocrine - Negative for history of diabetes    Objective:  TMax 24 hours:   Temp (24hrs), Av.7 °F (36.5 °C), Min:96.6 °F (35.9 °C), Max:98.4 °F (36.9 °C)      Vitals Ranges:   Temp:  [96.6 °F (35.9 °C)-98.4 °F (36.9 °C)] 98.4 °F (36.9 °C)  Heart Rate:  [55-83] 66  Resp:  [17-18] 18  BP: (126-153)/(82-99) 126/83    Intake/Output Last 3 shifts:  I/O  last 3 completed shifts:  In: 1000 [IV Piggyback:1000]  Out: -      Physical Exam:    Gen: Well developed, well nourished  HEENT: Normocephalic, atraumatic  CV: Appears well perfused  Resp: No increased work of breathing  Abd: nondistended  MSK: Bilateral upper extremities with full ROM  Neuro: No focal deficits appreciated  Psych: Normal mood and affect    Results review:   I reviewed the patient's new clinical results.    Data review:  Lab Results (last 24 hours)       Procedure Component Value Units Date/Time    Basic Metabolic Panel [[id number]]  (Abnormal) Collected: 08/28/24 0647    Specimen: Blood Updated: 08/28/24 0735     Glucose 141 mg/dL      BUN 14 mg/dL      Creatinine 0.70 mg/dL      Sodium 130 mmol/L      Potassium 4.5 mmol/L      Comment: Slight hemolysis detected by analyzer. Result may be falsely elevated.        Chloride 100 mmol/L      CO2 19.6 mmol/L      Calcium 8.8 mg/dL      BUN/Creatinine Ratio 20.0     Anion Gap 10.4 mmol/L      eGFR 113.7 mL/min/1.73     Narrative:      GFR Normal >60  Chronic Kidney Disease <60  Kidney Failure <15      CBC (No Diff) [[id number]]  (Abnormal) Collected: 08/28/24 0647    Specimen: Blood Updated: 08/28/24 0713     WBC 9.76 10*3/mm3      RBC 3.88 10*6/mm3      Hemoglobin 12.3 g/dL      Hematocrit 37.4 %      MCV 96.4 fL      MCH 31.7 pg      MCHC 32.9 g/dL      RDW 11.7 %      RDW-SD 40.9 fl      MPV 9.2 fL      Platelets 243 10*3/mm3     POC Glucose Once [[id number]]  (Normal) Collected: 08/28/24 0624    Specimen: Blood Updated: 08/28/24 0626     Glucose 126 mg/dL     Hemoglobin A1c [[id number]]  (Abnormal) Collected: 08/27/24 2038    Specimen: Blood Updated: 08/28/24 0603     Hemoglobin A1C 6.80 %     Narrative:      Hemoglobin A1C Ranges:    Increased Risk for Diabetes  5.7% to 6.4%  Diabetes                     >= 6.5%  Diabetic Goal                < 7.0%    POC Glucose Once [[id number]]  (Abnormal) Collected: 08/28/24 0155    Specimen: Blood Updated: 08/28/24  0201     Glucose 162 mg/dL     Urine Culture - Urine, Urine, Clean Catch [[id number]] Collected: 08/27/24 2102    Specimen: Urine, Clean Catch Updated: 08/28/24 0002    Blood Gas, Venous - [[id number]]  (Abnormal) Collected: 08/27/24 2328    Specimen: Venous Blood Updated: 08/27/24 2331     pH, Venous 7.400 pH Units      pCO2, Venous 33.7 mm Hg      pO2, Venous 41.0 mm Hg      HCO3, Venous 20.9 mmol/L      Base Excess, Venous -3.2 mmol/L      Comment: Serial Number: [id number]:  [id number]        O2 Saturation, Venous 76.9 %      CO2 Content 21.9 mmol/L      Barometric Pressure for Blood Gas 751.2000 mmHg      Modality Room Air     Rate 20 Breaths/minute      Device Comment Sat 100%    Urinalysis With Microscopic If Indicated (No Culture) - Urine, Clean Catch [[id number]]  (Abnormal) Collected: 08/27/24 2102    Specimen: Urine, Clean Catch Updated: 08/27/24 2305     Color, UA Dark Yellow     Appearance, UA Clear     pH, UA 5.5     Specific Gravity, UA >1.030     Glucose,  mg/dL (1+)     Ketones, UA 40 mg/dL (2+)     Bilirubin, UA Negative     Blood, UA Negative     Protein, UA Trace     Leuk Esterase, UA Negative     Nitrite, UA Positive     Urobilinogen, UA 1.0 E.U./dL    Urinalysis, Microscopic Only - Urine, Clean Catch [[id number]]  (Abnormal) Collected: 08/27/24 2102    Specimen: Urine, Clean Catch Updated: 08/27/24 2305     RBC, UA 0-2 /HPF      WBC, UA 3-5 /HPF      Bacteria, UA None Seen /HPF      Squamous Epithelial Cells, UA 0-2 /HPF      Hyaline Casts, UA 0-2 /LPF      Methodology Automated Microscopy    Comprehensive Metabolic Panel [[id number]]  (Abnormal) Collected: 08/27/24 2038    Specimen: Blood Updated: 08/27/24 2113     Glucose 266 mg/dL      BUN 20 mg/dL      Creatinine 0.85 mg/dL      Sodium 132 mmol/L      Potassium 4.4 mmol/L      Chloride 98 mmol/L      CO2 22.1 mmol/L      Calcium 9.6 mg/dL      Total Protein 7.1 g/dL      Albumin 4.5 g/dL      ALT (SGPT) 37 U/L      AST (SGOT) 35 U/L       Alkaline Phosphatase 56 U/L      Total Bilirubin 0.4 mg/dL      Globulin 2.6 gm/dL      A/G Ratio 1.7 g/dL      BUN/Creatinine Ratio 23.5     Anion Gap 11.9 mmol/L      eGFR 90.1 mL/min/1.73     Narrative:      GFR Normal >60  Chronic Kidney Disease <60  Kidney Failure <15      Lipase [[id number]]  (Normal) Collected: 08/27/24 2038    Specimen: Blood Updated: 08/27/24 2113     Lipase 27 U/L     hCG, Serum, Qualitative [[id number]]  (Normal) Collected: 08/27/24 2038    Specimen: Blood Updated: 08/27/24 2113     HCG Qualitative Negative    Nazlini Draw [[id number]] Collected: 08/27/24 2038    Specimen: Blood Updated: 08/27/24 2100    Narrative:      The following orders were created for panel order [facility]lini Draw.  Procedure                               Abnormality         Status                     ---------                               -----------         ------                     Green Top (Gel)[[id number]]                                  Final result               Lavender Top[[id number]]                                     Final result               Gold Top - SST[[id number]]                                                              Light Blue Top[[id number]]                                   Final result                 Please view results for these tests on the individual orders.    Green Top (Gel) [[id number]] Collected: 08/27/24 2038    Specimen: Blood Updated: 08/27/24 2100     Extra Tube Hold for add-ons.     Comment: Auto resulted.       Lavender Top [[id number]] Collected: 08/27/24 2038    Specimen: Blood Updated: 08/27/24 2100     Extra Tube hold for add-on     Comment: Auto resulted       Light Blue Top [[id number]] Collected: 08/27/24 2038    Specimen: Blood Updated: 08/27/24 2100     Extra Tube Hold for add-ons.     Comment: Auto resulted       CBC & Differential [[id number]]  (Abnormal) Collected: 08/27/24 2038    Specimen: Blood Updated: 08/27/24 2052    Narrative:      The following orders were created  for panel order CBC & Differential.  Procedure                               Abnormality         Status                     ---------                               -----------         ------                     CBC Auto Differential[[id number]]        Abnormal            Final result                 Please view results for these tests on the individual orders.    CBC Auto Differential [[id number]]  (Abnormal) Collected: 08/27/24 2038    Specimen: Blood Updated: 08/27/24 2052     WBC 9.94 10*3/mm3      RBC 4.20 10*6/mm3      Hemoglobin 13.2 g/dL      Hematocrit 41.0 %      MCV 97.6 fL      MCH 31.4 pg      MCHC 32.2 g/dL      RDW 12.5 %      RDW-SD 43.7 fl      MPV 8.9 fL      Platelets 282 10*3/mm3      Neutrophil % 84.4 %      Lymphocyte % 10.2 %      Monocyte % 4.1 %      Eosinophil % 0.2 %      Basophil % 0.5 %      Immature Grans % 0.6 %      Neutrophils, Absolute 8.39 10*3/mm3      Lymphocytes, Absolute 1.01 10*3/mm3      Monocytes, Absolute 0.41 10*3/mm3      Eosinophils, Absolute 0.02 10*3/mm3      Basophils, Absolute 0.05 10*3/mm3      Immature Grans, Absolute 0.06 10*3/mm3      nRBC 0.0 /100 WBC              Imaging:  Imaging Results (Last 24 Hours)       Procedure Component Value Units Date/Time    CT Abdomen Pelvis Without Contrast [[id number]] Collected: 08/27/24 2230     Updated: 08/27/24 2238    Narrative:      CT ABDOMEN PELVIS WO CONTRAST-     Radiation dose reduction techniques were utilized, including automated  exposure control and exposure modulation based on body size.     Clinical: Left leg pain     COMPARISON examination 12/20/2022     FINDINGS:  1. Located within the proximal left ureter, 4 cm below the UPJ, there is  a 5 mm ureteral calculus which is causing mild hydronephrosis. The  ureter beyond this point is collapsed to the bladder. There is  perinephric fat stranding and fluid worrisome for pyelonephritis or  forniceal rupture. 2 other small left-sided renal calculi seen. The  adrenal  glands and right kidney are normal, no right-sided obstructive  uropathy. The urinary bladder is typical in appearance.     2. The liver, gallbladder, pancreas, spleen and ovaries are normal.  Uterine fundal fibroid is again demonstrated, projecting posteriorly and  measuring approximately 3.6 cm.     3. The appendix, colon and small bowel have a satisfactory appearance.  Stomach and duodenum within normal limits. Diameter of the aorta is  normal. Few small lymph nodes seen in the left periaortic space, similar  to the previous examination.     CONCLUSION: Ureteral calculus 4 cm below the left UPJ causing mild  hydronephrosis, perinephric fat stranding and fluid worrisome for  pyelonephritis or forniceal rupture.              This report was finalized on 8/27/2024 10:35 PM by Dr. Zach Smiley M.D on Workstation: [id number]                  Assessment:       Kidney stone      Left UPJ stone (4mm) - UA negative for bacteria (+Nit/WBC). Patient pain controlled.    Plan:     -Patient like to try medical expulsive therapy prior to any intervention  - Recommend discharge patient on empiric antibiotic treatment and follow-up urine culture  - Pain control with NSAID therapy, narcotics per ED  - Recommend tamsulosin 0.4 mg nightly for MET  - Urology will schedule follow-up in 4 weeks with renal ultrasound to assess passage of stone    Alex Chau MD  08/28/24  09:31 EDT

## 2024-08-28 NOTE — PROGRESS NOTES
ED OBSERVATION PROGRESS/DISCHARGE SUMMARY    Date of Admission: 8/27/2024   LOS: 0 days   PCP: Jenny Garrett MD        Subjective     Hospital Outcome:   38-year-old female was seen and examined at bedside following admission to the observation unit secondary to left urolithiasis.  CT abdomen pelvis without showed 5 mm ureteral calculus within the proximal left ureter just 4 cm below the UPJ causing mild hydronephrosis, perinephric fat stranding and fluid that is worrisome for pyelonephritis or forniceal rupture.  Throughout the night patient reports that her pain was overall well-controlled and this morning denies any pain or discomfort.  Laboratory evaluation relatively unremarkable.  UA positive for nitrates therefore patient was started on IV Rocephin.  Urology evaluated patient this morning and after some discussion with patient, she would like to try medical expulsive therapy prior to any intervention therefore patient will be discharged home on Keflex, Flomax and hydrocodone to follow-up with urology in 4 weeks.    ROS:  General: no fevers, chills  Respiratory: no cough, dyspnea  Cardiovascular: no chest pain, palpitations  Abdomen: No abdominal pain, nausea, vomiting, or diarrhea  Neurologic: No focal weakness    Objective   Physical Exam:  I have reviewed the vital signs.  Temp:  [96.6 °F (35.9 °C)-98.4 °F (36.9 °C)] 98.4 °F (36.9 °C)  Heart Rate:  [55-83] 66  Resp:  [17-18] 18  BP: (126-153)/(82-99) 126/83  General Appearance:    Alert, cooperative, no distress  Head:    Normocephalic, atraumatic  Eyes:    Sclerae anicteric  Neck:   Supple, no mass  Lungs: Clear to auscultation bilaterally, respirations unlabored  Heart: Regular rate and rhythm, S1 and S2 normal, no murmur, rub or gallop  Abdomen:  Soft, nontender, bowel sounds active, nondistended  Extremities: No clubbing, cyanosis, or edema to lower extremities  Pulses:  2+ and symmetric in distal lower extremities  Skin: No rashes   Neurologic:  Oriented x3, Normal strength to extremities    Results Review:    I have reviewed the labs, radiology results and diagnostic studies.    Results from last 7 days   Lab Units 08/28/24  0647   WBC 10*3/mm3 9.76   HEMOGLOBIN g/dL 12.3   HEMATOCRIT % 37.4   PLATELETS 10*3/mm3 243     Results from last 7 days   Lab Units 08/27/24 2038   SODIUM mmol/L 132*   POTASSIUM mmol/L 4.4   CHLORIDE mmol/L 98   CO2 mmol/L 22.1   BUN mg/dL 20   CREATININE mg/dL 0.85   CALCIUM mg/dL 9.6   BILIRUBIN mg/dL 0.4   ALK PHOS U/L 56   ALT (SGPT) U/L 37*   AST (SGOT) U/L 35*   GLUCOSE mg/dL 266*     Imaging Results (Last 24 Hours)       Procedure Component Value Units Date/Time    CT Abdomen Pelvis Without Contrast [[id number]] Collected: 08/27/24 2230     Updated: 08/27/24 2238    Narrative:      CT ABDOMEN PELVIS WO CONTRAST-     Radiation dose reduction techniques were utilized, including automated  exposure control and exposure modulation based on body size.     Clinical: Left leg pain     COMPARISON examination 12/20/2022     FINDINGS:  1. Located within the proximal left ureter, 4 cm below the UPJ, there is  a 5 mm ureteral calculus which is causing mild hydronephrosis. The  ureter beyond this point is collapsed to the bladder. There is  perinephric fat stranding and fluid worrisome for pyelonephritis or  forniceal rupture. 2 other small left-sided renal calculi seen. The  adrenal glands and right kidney are normal, no right-sided obstructive  uropathy. The urinary bladder is typical in appearance.     2. The liver, gallbladder, pancreas, spleen and ovaries are normal.  Uterine fundal fibroid is again demonstrated, projecting posteriorly and  measuring approximately 3.6 cm.     3. The appendix, colon and small bowel have a satisfactory appearance.  Stomach and duodenum within normal limits. Diameter of the aorta is  normal. Few small lymph nodes seen in the left periaortic space, similar  to the previous examination.     CONCLUSION:  Ureteral calculus 4 cm below the left UPJ causing mild  hydronephrosis, perinephric fat stranding and fluid worrisome for  pyelonephritis or forniceal rupture.              This report was finalized on 8/27/2024 10:35 PM by Dr. Zach Smiley M.D on Workstation: [id number]               I have reviewed the medications.  ---------------------------------------------------------------------------------------------  Assessment & Plan   Assessment/Problem List    Kidney stone      Plan:  Urolithiasis  UTI  -Urology consult  -Pain control  -Vital signs every 4 hours  -N.p.o.   -As needed antiemetic  -Flomax 0.4 mg po daily  -Strain all urine  -NS @ 125 ml/hr   -Rocephin 2 g IV every 24 hours     Type 1 diabetes  -Accu-Cheks every 6 hours  -Continue home dose basal insulin  -Moderate high dose of correction insulin  -Hemoglobin T1c-lfemyqi    Disposition: Home    Follow-up after Discharge: Urology    This note will serve as a discharge summary    JOSH Araya 08/28/24 07:32 EDT    I have worn appropriate PPE during this patient encounter, sanitized my hands both with entering and exiting patient's room.      50 minutes has been spent by [facility] providers in the care of this patient while under observation status

## 2024-08-28 NOTE — PLAN OF CARE
Goal Outcome Evaluation:              Outcome Evaluation: Patient was admitted due to left lower quadrant pain. She has a history of DM1. Patient is alert and oriented times 4, on room air and independent. Pain managed with percocet. Denies pain this time, with intermittent mild nausea. CT done showed ureteral calculus causing hydronephrosis. Urology consultation this morning. Patient was instructed to strain all urine. Vital signs stable

## 2024-08-28 NOTE — DISCHARGE INSTRUCTIONS
In the emergency department if you develop a fever or blood in your urine  Follow-up with urology in 4 weeks

## 2024-08-29 LAB — BACTERIA SPEC AEROBE CULT: NO GROWTH

## 2024-09-18 ENCOUNTER — OFFICE VISIT (OUTPATIENT)
Dept: ENDOCRINOLOGY | Age: 39
End: 2024-09-18
Payer: COMMERCIAL

## 2024-09-18 VITALS
DIASTOLIC BLOOD PRESSURE: 80 MMHG | OXYGEN SATURATION: 100 % | TEMPERATURE: 98.7 F | HEIGHT: 67 IN | SYSTOLIC BLOOD PRESSURE: 126 MMHG | WEIGHT: 211 LBS | BODY MASS INDEX: 33.12 KG/M2 | HEART RATE: 91 BPM

## 2024-09-18 DIAGNOSIS — E10.65 TYPE 1 DIABETES MELLITUS WITH HYPERGLYCEMIA: Primary | ICD-10-CM

## 2024-09-18 PROCEDURE — 99213 OFFICE O/P EST LOW 20 MIN: CPT | Performed by: NURSE PRACTITIONER

## 2024-09-18 PROCEDURE — 95251 CONT GLUC MNTR ANALYSIS I&R: CPT | Performed by: NURSE PRACTITIONER

## 2024-10-03 DIAGNOSIS — E10.65 TYPE 1 DIABETES MELLITUS WITH HYPERGLYCEMIA: ICD-10-CM

## 2024-10-04 RX ORDER — INSULIN ASPART 100 [IU]/ML
INJECTION, SOLUTION INTRAVENOUS; SUBCUTANEOUS
Qty: 15 ML | Refills: 1 | Status: SHIPPED | OUTPATIENT
Start: 2024-10-04

## 2024-10-04 NOTE — TELEPHONE ENCOUNTER
Rx Refill Note  Requested Prescriptions     Pending Prescriptions Disp Refills    Insulin Aspart FlexPen 100 UNIT/ML solution pen-injector [Pharmacy Med Name: INSULIN ASPART FLEXPEN 100 UNIT/ML PEN] 15 mL 1     Sig: INJECT UP TO 50 UNITS SUBCUTANEOUSLY DAILY (1 UNIT PER 10 CARBS AND 1 UNIT FOR EVERY 50/100).      Last office visit with prescribing clinician: 9/18/2024   Last telemedicine visit with prescribing clinician: Visit date not found   Next office visit with prescribing clinician: 3/18/2025                         Would you like a call back once the refill request has been completed: [] Yes [] No    If the office needs to give you a call back, can they leave a voicemail: [] Yes [] No    Mariposa Roberts MA  10/04/24, 07:52 EDT

## 2024-10-24 DIAGNOSIS — E10.65 TYPE 1 DIABETES MELLITUS WITH HYPERGLYCEMIA: ICD-10-CM

## 2024-10-25 RX ORDER — PROCHLORPERAZINE 25 MG/1
1 SUPPOSITORY RECTAL
Qty: 1 EACH | Refills: 0 | Status: SHIPPED | OUTPATIENT
Start: 2024-10-25

## 2024-10-25 NOTE — TELEPHONE ENCOUNTER
Rx Refill Note  Requested Prescriptions     Pending Prescriptions Disp Refills    Continuous Glucose Transmitter (Dexcom G6 Transmitter) misc 1 each 0     Sig: Use 1 each Every 3 (Three) Months. Dispense 1 Dexcom G6 Transmitter for each 3 month period (NDC #[address]-75). Check 6 times a day. Dx: E 10.65  Indications: Insulin-Dependent Diabetes      Last office visit with prescribing clinician: 9/18/2024   Last telemedicine visit with prescribing clinician: Visit date not found   Next office visit with prescribing clinician: 3/18/2025                         Would you like a call back once the refill request has been completed: [] Yes [] No    If the office needs to give you a call back, can they leave a voicemail: [] Yes [] No    Josette Meadows MA  10/25/24, 08:08 EDT

## 2024-12-11 DIAGNOSIS — E10.65 TYPE 1 DIABETES MELLITUS WITH HYPERGLYCEMIA: ICD-10-CM

## 2024-12-12 DIAGNOSIS — E10.65 TYPE 1 DIABETES MELLITUS WITH HYPERGLYCEMIA: ICD-10-CM

## 2024-12-12 RX ORDER — INSULIN DEGLUDEC 200 U/ML
20 INJECTION, SOLUTION SUBCUTANEOUS DAILY
Qty: 9 ML | Refills: 0 | Status: SHIPPED | OUTPATIENT
Start: 2024-12-12

## 2024-12-12 NOTE — TELEPHONE ENCOUNTER
Rx Refill Note  Requested Prescriptions     Pending Prescriptions Disp Refills    Tresiba FlexTouch 200 UNIT/ML solution pen-injector pen injection [Pharmacy Med Name: TRESIBA FLEXTOUCH 200 UNIT/ML] 9 mL 0     Sig: INJECT 20 UNITS UNDER THE SKIN INTO THE APPROPRIATE AREA AS DIRECTED DAILY      Last office visit with prescribing clinician: 9/18/2024   Last telemedicine visit with prescribing clinician: Visit date not found   Next office visit with prescribing clinician: 3/18/2025                         Would you like a call back once the refill request has been completed: [] Yes [] No    If the office needs to give you a call back, can they leave a voicemail: [] Yes [] No    Madai Roberts  12/12/24, 08:01 EST

## 2024-12-13 RX ORDER — INSULIN ASPART 100 [IU]/ML
30 INJECTION, SOLUTION INTRAVENOUS; SUBCUTANEOUS DAILY
Qty: 15 ML | Refills: 1 | Status: SHIPPED | OUTPATIENT
Start: 2024-12-13

## 2024-12-13 NOTE — TELEPHONE ENCOUNTER
Rx Refill Note  Requested Prescriptions     Pending Prescriptions Disp Refills    Insulin Aspart FlexPen 100 UNIT/ML solution pen-injector 15 mL 1      Last office visit with prescribing clinician: 9/18/2024   Last telemedicine visit with prescribing clinician: Visit date not found   Next office visit with prescribing clinician: 3/18/2025                         Would you like a call back once the refill request has been completed: [] Yes [] No    If the office needs to give you a call back, can they leave a voicemail: [] Yes [] No    Barbara Aburto MA  12/13/24, 08:24 EST

## 2025-01-09 DIAGNOSIS — E10.65 TYPE 1 DIABETES MELLITUS WITH HYPERGLYCEMIA: ICD-10-CM

## 2025-01-09 RX ORDER — PROCHLORPERAZINE 25 MG/1
SUPPOSITORY RECTAL
Qty: 9 EACH | Refills: 0 | Status: SHIPPED | OUTPATIENT
Start: 2025-01-09

## 2025-01-09 NOTE — TELEPHONE ENCOUNTER
Rx Refill Note  Requested Prescriptions     Pending Prescriptions Disp Refills    Continuous Glucose Sensor (Dexcom G6 Sensor) 9 each 1     Sig: Apply  topically Every 10 (Ten) Days. USE AS DIRECTED TO MONITOR BLOOD SUGAR. REPLACE EVERY 10 DAYS.  Indications: Type 1 Diabetes      Last office visit with prescribing clinician: 9/18/2024   Last telemedicine visit with prescribing clinician: Visit date not found   Next office visit with prescribing clinician: 3/18/2025                         Would you like a call back once the refill request has been completed: [] Yes [] No    If the office needs to give you a call back, can they leave a voicemail: [] Yes [] No    Barbara Aburto MA  01/09/25, 14:42 EST

## 2025-02-13 DIAGNOSIS — E10.65 TYPE 1 DIABETES MELLITUS WITH HYPERGLYCEMIA: ICD-10-CM

## 2025-02-13 RX ORDER — PEN NEEDLE, DIABETIC 29 G X1/2"
NEEDLE, DISPOSABLE MISCELLANEOUS
Qty: 500 EACH | Refills: 4 | Status: SHIPPED | OUTPATIENT
Start: 2025-02-13

## 2025-02-13 RX ORDER — INSULIN ASPART 100 [IU]/ML
30 INJECTION, SOLUTION INTRAVENOUS; SUBCUTANEOUS DAILY
Qty: 15 ML | Refills: 0 | Status: SHIPPED | OUTPATIENT
Start: 2025-02-13

## 2025-02-13 RX ORDER — INSULIN DEGLUDEC 200 U/ML
20 INJECTION, SOLUTION SUBCUTANEOUS DAILY
Qty: 9 ML | Refills: 0 | Status: SHIPPED | OUTPATIENT
Start: 2025-02-13

## 2025-02-13 RX ORDER — PROCHLORPERAZINE 25 MG/1
1 SUPPOSITORY RECTAL
Qty: 1 EACH | Refills: 0 | Status: SHIPPED | OUTPATIENT
Start: 2025-02-13

## 2025-02-13 NOTE — TELEPHONE ENCOUNTER
Rx Refill Note  Requested Prescriptions     Pending Prescriptions Disp Refills    Insulin Pen Needle (RELION PEN NEEDLE 31G/8MM) 31G X 8 MM misc 500 each 4     Sig: For use with pen device up to 6 times a day. Can substitute based on availability and insurance. E10.65    Continuous Glucose Transmitter (Dexcom G6 Transmitter) misc 1 each 0     Sig: Use 1 each Every 3 (Three) Months. Dispense 1 Dexcom G6 Transmitter for each 3 month period (NDC #[address]-70). Check 6 times a day. Dx: E 10.65  Indications: Type 1 Diabetes    Tresiba FlexTouch 200 UNIT/ML solution pen-injector pen injection 9 mL 0     Sig: Inject 20 Units under the skin into the appropriate area as directed Daily.    Insulin Aspart FlexPen 100 UNIT/ML solution pen-injector 15 mL 1     Sig: Inject 30 Units under the skin into the appropriate area as directed Daily. Novolog 1 unit for 10 gm of carbs, 1 unit for 50 over 100 mg/dl. Max dose 30u daily      Last office visit with prescribing clinician: 9/18/2024   Last telemedicine visit with prescribing clinician: Visit date not found   Next office visit with prescribing clinician: 3/18/2025                         Would you like a call back once the refill request has been completed: [] Yes [] No    If the office needs to give you a call back, can they leave a voicemail: [] Yes [] No    Madai Roberts  02/13/25, 14:06 EST

## 2025-03-18 ENCOUNTER — OFFICE VISIT (OUTPATIENT)
Dept: ENDOCRINOLOGY | Age: 40
End: 2025-03-18
Payer: COMMERCIAL

## 2025-03-18 VITALS
OXYGEN SATURATION: 100 % | HEART RATE: 104 BPM | DIASTOLIC BLOOD PRESSURE: 78 MMHG | HEIGHT: 67 IN | BODY MASS INDEX: 33.21 KG/M2 | WEIGHT: 211.6 LBS | SYSTOLIC BLOOD PRESSURE: 142 MMHG | TEMPERATURE: 98.2 F

## 2025-03-18 DIAGNOSIS — E10.65 TYPE 1 DIABETES MELLITUS WITH HYPERGLYCEMIA: Primary | ICD-10-CM

## 2025-03-18 PROCEDURE — 95251 CONT GLUC MNTR ANALYSIS I&R: CPT | Performed by: NURSE PRACTITIONER

## 2025-03-18 PROCEDURE — 99213 OFFICE O/P EST LOW 20 MIN: CPT | Performed by: NURSE PRACTITIONER

## 2025-03-18 NOTE — PROGRESS NOTES
"Chief Complaint  Diabetes    Subjective        Nandini Morrison presents to [facility] ENDOCRINOLOGY  History of Present Illness    Broke her foot right before thanksgiving, was off her normal schedule for the last 4 months which affected her eating habits and activity levels     Type 1 dm, age 16  DM regimen: tresiba 20-22 units in the am, Novolog 1 unit for 10 gm of carbs, 1 unit for 50 over 100 mg/dl.     9/2024: Change carb ratio to 1u:12g given hypoglycemia and 1u:15g the meal before exercise planned      Sensor 3/5/25-3/18/25  Average glucose 162  GMI 7.2%  57% time in range  4% low  3% very low- timing of insulin injection- after meals or over estimation of meal time dosing   26% high  10% very high     Last eye exam - 3/2024, time to arrange f/u   Glucagon: baqsimi       Objective   Vital Signs:  /78   Pulse 104   Temp 98.2 °F (36.8 °C) (Oral)   Ht 170.2 cm (67.01\")   Wt 96 kg (211 lb 9.6 oz)   SpO2 100%   BMI 33.13 kg/m²   Estimated body mass index is 33.13 kg/m² as calculated from the following:    Height as of this encounter: 170.2 cm (67.01\").    Weight as of this encounter: 96 kg (211 lb 9.6 oz).          Physical Exam  Vitals reviewed.   Constitutional:       General: She is not in acute distress.  HENT:      Head: Normocephalic and atraumatic.   Cardiovascular:      Rate and Rhythm: Normal rate.   Pulmonary:      Effort: Pulmonary effort is normal. No respiratory distress.   Musculoskeletal:         General: No signs of injury. Normal range of motion.      Cervical back: Normal range of motion and neck supple.   Skin:     General: Skin is warm and dry.   Neurological:      Mental Status: She is alert and oriented to person, place, and time. Mental status is at baseline.   Psychiatric:         Mood and Affect: Mood normal.         Behavior: Behavior normal.         Thought Content: Thought content normal.         Judgment: Judgment normal.          Result Review :  The " following data was reviewed by: JOSH Raines on 03/18/2025:  Common labs          8/27/2024    20:38 8/28/2024    06:47 9/4/2024    15:01   Common Labs   Glucose 266  141  91    BUN 20  14  13    Creatinine 0.85  0.70  0.71    Sodium 132  130  139    Potassium 4.4  4.5  4.7    Chloride 98  100  102    Calcium 9.6  8.8  9.6    Albumin 4.5   4.0    Total Bilirubin 0.4   0.4    Alkaline Phosphatase 56   57    AST (SGOT) 35   26    ALT (SGPT) 37   28    WBC 9.94  9.76     Hemoglobin 13.2  12.3     Hematocrit 41.0  37.4     Platelets 282  243     Total Cholesterol   180    Triglycerides   57    HDL Cholesterol   75    LDL Cholesterol    94    Hemoglobin A1C 6.80   7.00    Microalbumin, Urine   11.7                Assessment and Plan   Diagnoses and all orders for this visit:    1. Type 1 diabetes mellitus with hyperglycemia (Primary)  -     Lipid Panel  -     Hemoglobin A1c  -     Comprehensive Metabolic Panel  -     Microalbumin / Creatinine Urine Ratio - Urine, Clean Catch  -     TSH             Follow Up   Return in about 6 months (around 9/18/2025).    Cgm reviewed, slightly off with recent leg injury  Continued hypoglycemia prevention and early intervention  Labs today  Additional recommendations to follow as needed    Patient was given instructions and counseling regarding her condition or for health maintenance advice. Please see specific information pulled into the AVS if appropriate.       JOSH Raines

## 2025-03-19 LAB
ALBUMIN SERPL-MCNC: 4.3 G/DL (ref 3.5–5.2)
ALBUMIN/CREAT UR: <14 MG/G CREAT (ref 0–29)
ALBUMIN/GLOB SERPL: 1.7 G/DL
ALP SERPL-CCNC: 67 U/L (ref 39–117)
ALT SERPL-CCNC: 20 U/L (ref 1–33)
AST SERPL-CCNC: 24 U/L (ref 1–32)
BILIRUB SERPL-MCNC: 0.3 MG/DL (ref 0–1.2)
BUN SERPL-MCNC: 9 MG/DL (ref 6–20)
BUN/CREAT SERPL: 12.7 (ref 7–25)
CALCIUM SERPL-MCNC: 9.8 MG/DL (ref 8.6–10.5)
CHLORIDE SERPL-SCNC: 101 MMOL/L (ref 98–107)
CHOLEST SERPL-MCNC: 189 MG/DL (ref 0–200)
CO2 SERPL-SCNC: 27.5 MMOL/L (ref 22–29)
CREAT SERPL-MCNC: 0.71 MG/DL (ref 0.57–1)
CREAT UR-MCNC: 21.2 MG/DL
EGFRCR SERPLBLD CKD-EPI 2021: 111.1 ML/MIN/1.73
GLOBULIN SER CALC-MCNC: 2.5 GM/DL
GLUCOSE SERPL-MCNC: 208 MG/DL (ref 65–99)
HBA1C MFR BLD: 6.8 % (ref 4.8–5.6)
HDLC SERPL-MCNC: 75 MG/DL (ref 40–60)
IMP & REVIEW OF LAB RESULTS: NORMAL
LDLC SERPL CALC-MCNC: 104 MG/DL (ref 0–100)
MICROALBUMIN UR-MCNC: <3 UG/ML
POTASSIUM SERPL-SCNC: 5.7 MMOL/L (ref 3.5–5.2)
PROT SERPL-MCNC: 6.8 G/DL (ref 6–8.5)
SODIUM SERPL-SCNC: 137 MMOL/L (ref 136–145)
TRIGL SERPL-MCNC: 54 MG/DL (ref 0–150)
TSH SERPL DL<=0.005 MIU/L-ACNC: 2.51 UIU/ML (ref 0.27–4.2)
VLDLC SERPL CALC-MCNC: 10 MG/DL (ref 5–40)

## 2025-04-01 ENCOUNTER — TELEPHONE (OUTPATIENT)
Dept: ENDOCRINOLOGY | Age: 40
End: 2025-04-01

## 2025-04-01 NOTE — TELEPHONE ENCOUNTER
Caller: Nandini Morrison    Relationship: Self    Best call back number: [phone]    What is the best time to reach you: ANYTIME    Who are you requesting to speak with (clinical staff, provider,  specific staff member): STAFF    Do you know the name of the person who called: HENRIQUE    What was the call regarding: LAB RESULTS

## 2025-04-13 DIAGNOSIS — E10.65 TYPE 1 DIABETES MELLITUS WITH HYPERGLYCEMIA: ICD-10-CM

## 2025-04-14 RX ORDER — INSULIN ASPART 100 [IU]/ML
30 INJECTION, SOLUTION INTRAVENOUS; SUBCUTANEOUS DAILY
Qty: 45 ML | Refills: 0 | Status: SHIPPED | OUTPATIENT
Start: 2025-04-14

## 2025-04-14 RX ORDER — PROCHLORPERAZINE 25 MG/1
SUPPOSITORY RECTAL
Qty: 9 EACH | Refills: 0 | Status: SHIPPED | OUTPATIENT
Start: 2025-04-14

## 2025-04-14 NOTE — TELEPHONE ENCOUNTER
Rx Refill Note  Requested Prescriptions     Pending Prescriptions Disp Refills    Continuous Glucose Sensor (Dexcom G6 Sensor) 9 each 0     Sig: Apply  topically Every 10 (Ten) Days. USE AS DIRECTED TO MONITOR BLOOD SUGAR. REPLACE EVERY 10 DAYS.  Indications: Type 1 Diabetes    Insulin Aspart FlexPen 100 UNIT/ML solution pen-injector 15 mL 0     Sig: Inject 30 Units under the skin into the appropriate area as directed Daily. Novolog 1 unit for 10 gm of carbs, 1 unit for 50 over 100 mg/dl. Max dose 30u daily      Last office visit with prescribing clinician: 3/18/2025   Last telemedicine visit with prescribing clinician: Visit date not found   Next office visit with prescribing clinician: 9/18/2025                         Would you like a call back once the refill request has been completed: [] Yes [] No    If the office needs to give you a call back, can they leave a voicemail: [] Yes [] No    Barbara Aburto MA  04/14/25, 08:58 EDT

## 2025-05-18 DIAGNOSIS — E10.65 TYPE 1 DIABETES MELLITUS WITH HYPERGLYCEMIA: ICD-10-CM

## 2025-05-19 RX ORDER — PROCHLORPERAZINE 25 MG/1
1 SUPPOSITORY RECTAL
Qty: 1 EACH | Refills: 0 | Status: SHIPPED | OUTPATIENT
Start: 2025-05-19

## 2025-05-19 RX ORDER — INSULIN ASPART 100 [IU]/ML
30 INJECTION, SOLUTION INTRAVENOUS; SUBCUTANEOUS DAILY
Qty: 45 ML | Refills: 0 | Status: SHIPPED | OUTPATIENT
Start: 2025-05-19

## 2025-05-19 RX ORDER — INSULIN DEGLUDEC 200 U/ML
20 INJECTION, SOLUTION SUBCUTANEOUS DAILY
Qty: 9 ML | Refills: 0 | Status: SHIPPED | OUTPATIENT
Start: 2025-05-19

## 2025-05-19 NOTE — TELEPHONE ENCOUNTER
Rx Refill Note  Requested Prescriptions     Pending Prescriptions Disp Refills    Continuous Glucose Transmitter (Dexcom G6 Transmitter) misc 1 each 0     Sig: Use 1 each Every 3 (Three) Months. Dispense 1 Dexcom G6 Transmitter for each 3 month period ([facility] #[address]-69). Check 6 times a day. Dx: E 10.65  Indications: Type 1 Diabetes    Tresiba FlexTouch 200 UNIT/ML solution pen-injector pen injection 9 mL 0     Sig: Inject 20 Units under the skin into the appropriate area as directed Daily.    Insulin Aspart FlexPen 100 UNIT/ML solution pen-injector 45 mL 0     Sig: Inject 30 Units under the skin into the appropriate area as directed Daily. Novolog 1 unit for 10 gm of carbs, 1 unit for 50 over 100 mg/dl. Max dose 30u daily      Last office visit with prescribing clinician: 3/18/2025   Last telemedicine visit with prescribing clinician: Visit date not found   Next office visit with prescribing clinician: 9/18/2025                         Would you like a call back once the refill request has been completed: [] Yes [] No    If the office needs to give you a call back, can they leave a voicemail: [] Yes [] No    Madai Roberts  05/19/25, 08:40 EDT  Madai Roberts  5/19/2025  08:40 EDT

## 2025-07-10 DIAGNOSIS — E10.65 TYPE 1 DIABETES MELLITUS WITH HYPERGLYCEMIA: ICD-10-CM

## 2025-07-10 RX ORDER — PROCHLORPERAZINE 25 MG/1
SUPPOSITORY RECTAL
Qty: 9 EACH | Refills: 0 | Status: SHIPPED | OUTPATIENT
Start: 2025-07-10

## 2025-07-10 NOTE — TELEPHONE ENCOUNTER
Rx Refill Note  Requested Prescriptions     Pending Prescriptions Disp Refills    Continuous Glucose Sensor (Dexcom G6 Sensor) 9 each 0     Sig: Apply  topically Every 10 (Ten) Days. USE AS DIRECTED TO MONITOR BLOOD SUGAR. REPLACE EVERY 10 DAYS.  Indications: Type 1 Diabetes      Last office visit with prescribing clinician: 3/18/2025   Last telemedicine visit with prescribing clinician: Visit date not found   Next office visit with prescribing clinician: 9/18/2025                         Would you like a call back once the refill request has been completed: [] Yes [] No    If the office needs to give you a call back, can they leave a voicemail: [] Yes [] No    Madai Roberts  07/10/25, 07:48 EDT  Madai Roberts  7/10/2025  07:48 EDT

## 2025-08-14 DIAGNOSIS — E10.65 TYPE 1 DIABETES MELLITUS WITH HYPERGLYCEMIA: ICD-10-CM

## 2025-08-14 RX ORDER — INSULIN ASPART 100 [IU]/ML
INJECTION, SOLUTION INTRAVENOUS; SUBCUTANEOUS
Qty: 27 ML | Refills: 0 | Status: SHIPPED | OUTPATIENT
Start: 2025-08-14

## 2025-08-14 RX ORDER — PROCHLORPERAZINE 25 MG/1
SUPPOSITORY RECTAL
Qty: 9 EACH | Refills: 0 | OUTPATIENT
Start: 2025-08-14

## 2025-08-14 RX ORDER — INSULIN DEGLUDEC 200 U/ML
INJECTION, SOLUTION SUBCUTANEOUS
Qty: 9 ML | Refills: 0 | Status: SHIPPED | OUTPATIENT
Start: 2025-08-14

## 2025-08-14 RX ORDER — PROCHLORPERAZINE 25 MG/1
SUPPOSITORY RECTAL
Qty: 1 EACH | Refills: 0 | Status: SHIPPED | OUTPATIENT
Start: 2025-08-14

## 2025-08-19 ENCOUNTER — PRIOR AUTHORIZATION (OUTPATIENT)
Dept: ENDOCRINOLOGY | Age: 40
End: 2025-08-19
Payer: COMMERCIAL

## (undated) DEVICE — GLV SURG BIOGEL ECLPS LTX PF 7.5

## (undated) DEVICE — TIDISHIELD UROLOGY DRAIN BAGS FROSTY VINYL STERILE FITS SIEMENS UROSKOP ACCESS 20 PER CASE: Brand: TIDISHIELD

## (undated) DEVICE — SHEATH URETRL ACC NAVIGATOR 13/15F 36CM 5PK

## (undated) DEVICE — GLV SURG BIOGEL LTX PF 7 1/2

## (undated) DEVICE — NITINOL WIRE WITH HYDROPHILIC TIP: Brand: SENSOR

## (undated) DEVICE — CATH URETRL FLXITP POLLACK STD 5F 70CM

## (undated) DEVICE — NITINOL STONE RETRIEVAL BASKET: Brand: ZERO TIP

## (undated) DEVICE — PRT BIOP SEALS

## (undated) DEVICE — FIBR LASR FLEXIVAPULSE242 HOLMIUM FLT/TP 1P/U

## (undated) DEVICE — PK URETSCP 40

## (undated) DEVICE — LOU CYSTO: Brand: MEDLINE INDUSTRIES, INC.